# Patient Record
Sex: FEMALE | Race: WHITE | NOT HISPANIC OR LATINO | Employment: FULL TIME | URBAN - METROPOLITAN AREA
[De-identification: names, ages, dates, MRNs, and addresses within clinical notes are randomized per-mention and may not be internally consistent; named-entity substitution may affect disease eponyms.]

---

## 2018-06-19 ENCOUNTER — OFFICE VISIT (OUTPATIENT)
Dept: FAMILY MEDICINE | Facility: CLINIC | Age: 43
End: 2018-06-19
Payer: COMMERCIAL

## 2018-06-19 VITALS
HEIGHT: 67 IN | TEMPERATURE: 99.6 F | BODY MASS INDEX: 35.28 KG/M2 | OXYGEN SATURATION: 96 % | DIASTOLIC BLOOD PRESSURE: 66 MMHG | SYSTOLIC BLOOD PRESSURE: 112 MMHG | WEIGHT: 224.8 LBS | HEART RATE: 98 BPM | RESPIRATION RATE: 18 BRPM

## 2018-06-19 DIAGNOSIS — L27.0 ALLERGIC DRUG RASH: Primary | ICD-10-CM

## 2018-06-19 PROCEDURE — 99213 OFFICE O/P EST LOW 20 MIN: CPT | Performed by: NURSE PRACTITIONER

## 2018-06-19 RX ORDER — DESOGESTREL AND ETHINYL ESTRADIOL 21-5 (28)
KIT ORAL
COMMUNITY
Start: 2011-08-17 | End: 2023-11-27

## 2018-06-19 RX ORDER — FAMOTIDINE 20 MG/1
20 TABLET, FILM COATED ORAL
COMMUNITY
Start: 2017-05-02 | End: 2018-11-29 | Stop reason: ENTERED-IN-ERROR

## 2018-06-19 RX ORDER — DEXAMETHASONE SODIUM PHOSPHATE 4 MG/ML
8 INJECTION, SOLUTION INTRA-ARTICULAR; INTRALESIONAL; INTRAMUSCULAR; INTRAVENOUS; SOFT TISSUE ONCE
Status: COMPLETED | OUTPATIENT
Start: 2018-06-19 | End: 2018-06-19

## 2018-06-19 RX ORDER — CITALOPRAM 40 MG/1
40 TABLET, FILM COATED ORAL
COMMUNITY
Start: 2017-05-17 | End: 2018-06-20 | Stop reason: SDUPTHER

## 2018-06-19 RX ADMIN — DEXAMETHASONE SODIUM PHOSPHATE 8 MG: 4 INJECTION, SOLUTION INTRA-ARTICULAR; INTRALESIONAL; INTRAMUSCULAR; INTRAVENOUS; SOFT TISSUE at 11:02

## 2018-06-19 ASSESSMENT — ENCOUNTER SYMPTOMS
COUGH: 0
NAUSEA: 1
WHEEZING: 0
FATIGUE: 1
FEVER: 0
HEADACHES: 1
CONSTIPATION: 0
SHORTNESS OF BREATH: 0
ABDOMINAL PAIN: 0
CHILLS: 1
DIARRHEA: 0

## 2018-06-19 NOTE — LETTER
June 19, 2018     Patient: Barbra Norwood   YOB: 1975   Date of Visit: 6/19/2018       To Whom it May Concern:    Barbra Norwood was seen in my clinic on 6/19/2018 at 10:40 am. Please excuse Barbra for her absence from work on this day to make the appointment.    If you have any questions or concerns, please don't hesitate to call.         Sincerely,         ANGELITA Valdez        CC: No Recipients

## 2018-06-19 NOTE — PATIENT INSTRUCTIONS
Pt presents with diffuse drug induced rash. Stop Bactrim. Site of tattoo is not hot or erythematous at this time .      Decadron 8mg IM given x1. Pt was hypotensive on initial eval, improved with rest and laying pt supine with knees elevated. Pt called for a ride home from a friend. You can also take OTC Benedryl 25mg every 6-8 hours if needed.     Maintain hydration    If symptoms return, if you become short of breath, or dizzy, call 911 and go to the closest emergency department right away for evaluation. Pt picked up by friend Sonia

## 2018-06-19 NOTE — PROGRESS NOTES
[facility]  [address] [place] [address]  [phone]     Reason for visit:   Chief Complaint   Patient presents with   • Rash     SX for about 1 day. Pt states that locations are on thighs, stomach, arms and neck. Pt had started SMZ/TMP DS 800mg /160      HPI   Barbra Norwood is a 43 y.o. female who presents with drug induced allergy      Past Medical History:   Diagnosis Date   • Arthritis    • Depression      Past Surgical History:   Procedure Laterality Date   •  SECTION      2x   • TONSILLECTOMY       Social History     Social History Narrative   • No narrative on file     Family History   Problem Relation Age of Onset   • Breast cancer Mother    • Arthritis Mother    • Arthritis Maternal Grandmother    • Arthritis Maternal Grandfather    • Breast cancer Mother's Sister    • Autism Child      Penicillins  Current Outpatient Prescriptions   Medication Sig Dispense Refill   • citalopram (CELEXA) 40 mg tablet 40 mg.     • desog-e.estradiol/e.estradiol (MIRCETTE, 28,) 0.15-0.02 mgx21 /0.01 mg x 5 per tablet take 1 tablet by oral route  every day     • famotidine (PEPCID) 20 mg tablet 20 mg.     • omega 3-dha-epa-fish oil (FISH OIL) capsule 2 grams daily       No current facility-administered medications for this visit.        Review of Systems   Constitutional: Positive for chills and fatigue. Negative for fever.   Respiratory: Negative for cough, shortness of breath and wheezing.    Cardiovascular: Negative for chest pain.   Gastrointestinal: Positive for nausea. Negative for abdominal pain, constipation and diarrhea.   Skin:        Infected tattoo site L ankle, diffuse rash started today after a week of Bactrim   Neurological: Positive for headaches.       Objective     Vitals:    18 1050   BP: 112/66   Pulse: 98   Resp:    Temp:    SpO2: 96%       Physical Exam   Constitutional: She appears well-developed and well-nourished.   HENT:   Head: Normocephalic and  atraumatic.   Cardiovascular: Regular rhythm and normal heart sounds.  Tachycardia present.    Pulmonary/Chest: Effort normal and breath sounds normal.   Skin: Rash noted. There is pallor.   Pt is pale with diffuse rash on neck , arms, and abdomen. Pt states she began to feel nauseous and weak 2d ago, worsening since. Pt states she went to bed last night after work and did not get up again until this morning. Pt states diffuse rash started this morning.        Recent labs before today:     Lab Results   Component Value Date    WBC 6.8 05/10/2017    HGB 13.7 05/10/2017    HCT 41.4 05/10/2017     05/10/2017    CHOL 208 (H) 09/29/2016    TRIG 183 (H) 09/29/2016    HDL 60 09/29/2016    ALT 11 09/29/2016    AST 13 09/29/2016     09/29/2016    K 4.8 09/29/2016     09/29/2016    CREATININE 0.80 09/29/2016    BUN 14 09/29/2016    CO2 23 09/29/2016    TSH 2.81 05/10/2017    HGBA1C 5.6 09/29/2016        Procedures   Assessment   [unfilled]  Problem List Items Addressed This Visit     None      Visit Diagnoses     Allergic drug rash    -  Primary    Relevant Medications    dexamethasone (DECADRON) injection 8 mg (Completed)              ANGELITA Valdez  6/19/2018

## 2018-06-20 ENCOUNTER — TELEPHONE (OUTPATIENT)
Dept: FAMILY MEDICINE | Facility: CLINIC | Age: 43
End: 2018-06-20

## 2018-06-20 RX ORDER — PREDNISONE 10 MG/1
TABLET ORAL
Qty: 12 TABLET | Refills: 0 | Status: SHIPPED | OUTPATIENT
Start: 2018-06-20 | End: 2018-06-21

## 2018-06-20 NOTE — TELEPHONE ENCOUNTER
Started benadryl and had decadron injection yesterday. Now her face and ears are red and hot.  Should she start prednisone?  Allergic to PCN and sulfa. Uses [facility].  Did leave you a VM this morning.

## 2018-06-20 NOTE — TELEPHONE ENCOUNTER
LM for pt that if she is having any signs of an allergic reaction to go to an ER immediately  In interim will discuss with Kavon, unsure if decadron can caused that flush feeling pt noted

## 2018-06-20 NOTE — TELEPHONE ENCOUNTER
Half life of Bactrim is approximately 11 hours, it will take up to 6 half-life intervals for the drug to be out of the body as it had been taken consistently prior to the reaction for several days. 6 half-life intervals is 2.5 days from the last dose. Residual symptoms are likely from the Bactrim still in her system.    I am OK with prednisone taper - sent to [facility] on [address] in [place].

## 2018-06-21 ENCOUNTER — OFFICE VISIT (OUTPATIENT)
Dept: FAMILY MEDICINE | Facility: CLINIC | Age: 43
End: 2018-06-21
Payer: COMMERCIAL

## 2018-06-21 VITALS
HEIGHT: 67 IN | WEIGHT: 222 LBS | SYSTOLIC BLOOD PRESSURE: 112 MMHG | TEMPERATURE: 98 F | HEART RATE: 76 BPM | BODY MASS INDEX: 34.84 KG/M2 | DIASTOLIC BLOOD PRESSURE: 78 MMHG | OXYGEN SATURATION: 99 %

## 2018-06-21 DIAGNOSIS — Z88.9 DRUG ALLERGY: Primary | ICD-10-CM

## 2018-06-21 PROCEDURE — 99213 OFFICE O/P EST LOW 20 MIN: CPT | Performed by: FAMILY MEDICINE

## 2018-06-21 RX ORDER — PREDNISONE 20 MG/1
TABLET ORAL
Qty: 7 TABLET | Refills: 0 | Status: SHIPPED | OUTPATIENT
Start: 2018-06-21 | End: 2018-11-29 | Stop reason: ENTERED-IN-ERROR

## 2018-06-21 ASSESSMENT — ENCOUNTER SYMPTOMS
SHORTNESS OF BREATH: 0
ABDOMINAL PAIN: 0
HEADACHES: 0
LIGHT-HEADEDNESS: 0
COUGH: 0
FEVER: 0
ABDOMINAL DISTENTION: 0
DIFFICULTY URINATING: 0
BLOOD IN STOOL: 0
UNEXPECTED WEIGHT CHANGE: 0
WEAKNESS: 0
FATIGUE: 0
VOMITING: 0
TROUBLE SWALLOWING: 0
ADENOPATHY: 0
PALPITATIONS: 0
CONSTIPATION: 0
WHEEZING: 0
NAUSEA: 0
DIZZINESS: 0
DIARRHEA: 0

## 2018-06-21 NOTE — PATIENT INSTRUCTIONS
Patient Education     Hives  Hives (urticaria) are itchy, red, swollen areas on your skin. Hives can appear on any part of your body and can vary in size. They can be as small as the tip of a pen or much larger. Hives often fade within 24 hours (acute hives). In other cases, new hives appear after old ones fade. This cycle can continue for several days or weeks (chronic hives).  Hives result from your body's reaction to an irritant or to something that you are allergic to (trigger). When you are exposed to a trigger, your body releases a chemical (histamine) that causes redness, itching, and swelling. You can get hives immediately after being exposed to a trigger or hours later.  Hives do not spread from person to person (are not contagious). Your hives may get worse with scratching, exercise, and emotional stress.  What are the causes?  Causes of this condition include:  · Allergies to certain foods or ingredients.  · Insect bites or stings.  · Exposure to pollen or pet dander.  · Contact with latex or chemicals.  · Spending time in sunlight, heat, or cold (exposure).  · Exercise.  · Stress.  You can also get hives from some medical conditions and treatments. These include:  · Viruses, including the common cold.  · Bacterial infections, such as urinary tract infections and strep throat.  · Disorders such as vasculitis, lupus, or thyroid disease.  · Certain medications.  · Allergy shots.  · Blood transfusions.  Sometimes, the cause of hives is not known (idiopathic hives).  What increases the risk?  This condition is more likely to develop in:  · Women.  · People who have food allergies, especially to citrus fruits, milk, eggs, peanuts, tree nuts, or shellfish.  · People who are allergic to:  ¨ Medicines.  ¨ Latex.  ¨ Insects.  ¨ Animals.  ¨ Pollen.  · People who have certain medical conditions, including lupus or thyroid disease.  What are the signs or symptoms?  The main symptom of this condition is raised,  itchy red or white bumps or patches on your skin. These areas may:  · Become large and swollen (welts).  · Change in shape and location, quickly and repeatedly.  · Be separate hives or connect over a large area of skin.  · Sting or become painful.  · Turn white when pressed in the center (bailee).  In severe cases, your hands, feet, and face may also become swollen. This may occur if hives develop deeper in your skin.  How is this diagnosed?  This condition is diagnosed based on your symptoms, medical history, and physical exam. Your skin, urine, or blood may be tested to find out what is causing your hives and to rule out other health issues. Your health care provider may also remove a small sample of skin from the affected area and examine it under a microscope (biopsy).  How is this treated?  Treatment depends on the severity of your condition. Your health care provider may recommend using cool, wet cloths (cool compresses) or taking cool showers to relieve itching. Hives are sometimes treated with medicines, including:  · Antihistamines.  · Corticosteroids.  · Antibiotics.  · An injectable medicine (omalizumab). Your health care provider may prescribe this if you have chronic idiopathic hives and you continue to have symptoms even after treatment with antihistamines.  Severe cases may require an emergency injection of adrenaline (epinephrine) to prevent a life-threatening allergic reaction (anaphylaxis).  Follow these instructions at home:  Medicines  · Take or apply over-the-counter and prescription medicines only as told by your health care provider.  · If you were prescribed an antibiotic medicine, use it as told by your health care provider. Do not stop taking the antibiotic even if you start to feel better.  Skin Care  · Apply cool compresses to the affected areas.  · Do not scratch or rub your skin.  General instructions  · Do not take hot showers or baths. This can make itching worse.  · Do not wear  tight-fitting clothing.  · Use sunscreen and wear protective clothing when you are outside.  · Avoid any substances that cause your hives. Keep a journal to help you track what causes your hives. Write down:  ¨ What medicines you take.  ¨ What you eat and drink.  ¨ What products you use on your skin.  · Keep all follow-up visits as told by your health care provider. This is important.  Contact a health care provider if:  · Your symptoms are not controlled with medicine.  · Your joints are painful or swollen.  Get help right away if:  · You have a fever.  · You have pain in your abdomen.  · Your tongue or lips are swollen.  · Your eyelids are swollen.  · Your chest or throat feels tight.  · You have trouble breathing or swallowing.  These symptoms may represent a serious problem that is an emergency. Do not wait to see if the symptoms will go away. Get medical help right away. Call your local emergency services (911 in the [place] Do not drive yourself to the hospital.  This information is not intended to replace advice given to you by your health care provider. Make sure you discuss any questions you have with your health care provider.  Document Released: 12/18/2006 Document Revised: 05/17/2017 Document Reviewed: 10/05/2016  Elsevier Interactive Patient Education © 2017 [employer]

## 2018-06-21 NOTE — PROGRESS NOTES
Daily Progress Note       Patient ID: Barbra Norwood is a 43 y.o. female.    HPI    43 year old female presents for follow up visit.     Seen on 6/19/2018 by Robert Carranza. Presented with symptoms of rash on thighs, stomach, arms and neck. Had been started on Bactrim DS due to infected tattoo site on left ankle. After week of Bactrim the rash began. In the office decadron 8 mg IM was administered. Reports that day it felt like the rash was improving - her body which had felt like it was burning symptoms resolved. Yesterday the burning symptoms started again and the rash has now also spread to face and lower extremities. Has been taking benadryl 50 mg q4 hours but her last dose today was around 3 am.      The following have been reviewed and updated as appropriate in this visit:  Allergies  Meds  Problems       Review of Systems   Constitutional: Negative for fatigue, fever and unexpected weight change.   HENT: Negative for trouble swallowing.    Eyes: Negative for visual disturbance.   Respiratory: Negative for cough, shortness of breath and wheezing.    Cardiovascular: Negative for chest pain, palpitations and leg swelling.   Gastrointestinal: Negative for abdominal distention, abdominal pain, blood in stool, constipation, diarrhea, nausea and vomiting.   Endocrine: Negative for cold intolerance and heat intolerance.   Genitourinary: Negative for difficulty urinating.   Skin: Positive for rash.   Neurological: Negative for dizziness, syncope, weakness, light-headedness and headaches.   Hematological: Negative for adenopathy.             Current Outpatient Prescriptions   Medication Sig Dispense Refill   • citalopram (CELEXA) 40 mg tablet TAKE 1 TABLET BY MOUTH EVERY DAY 90 tablet 0   • desog-e.estradiol/e.estradiol (MIRCETTE, 28,) 0.15-0.02 mgx21 /0.01 mg x 5 per tablet take 1 tablet by oral route  every day     • omega 3-dha-epa-fish oil (FISH OIL) capsule 2 grams daily     • famotidine (PEPCID) 20 mg  "tablet 20 mg.     • predniSONE (DELTASONE) 20 mg tablet Days 1-2: 2 tablets once daily; Days 3-5: 1 tablet once daily 7 tablet 0     No current facility-administered medications for this visit.      Past Medical History:   Diagnosis Date   • Arthritis    • Depression      Family History   Problem Relation Age of Onset   • Breast cancer Mother    • Arthritis Mother    • Arthritis Maternal Grandmother    • Arthritis Maternal Grandfather    • Breast cancer Mother's Sister    • Autism Child      Past Surgical History:   Procedure Laterality Date   •  SECTION      2x   • TONSILLECTOMY       Social History     Social History   • Marital status:      Spouse name: N/A   • Number of children: N/A   • Years of education: N/A     Occupational History   • Not on file.     Social History Main Topics   • Smoking status: Never Smoker   • Smokeless tobacco: Never Used   • Alcohol use Yes      Comment: rarely   • Drug use: No   • Sexual activity: Not on file     Other Topics Concern   • Not on file     Social History Narrative   • No narrative on file     Allergies   Allergen Reactions   • Bactrim [Sulfamethoxazole-Trimethoprim] Hives   • Penicillins Angioedema       Objective   No new labs.    Vitals:    18 0958   BP: 112/78   BP Location: Left upper arm   Patient Position: Sitting   Pulse: 76   Temp: 36.7 °C (98 °F)   TempSrc: Oral   SpO2: 99%   Weight: 101 kg (222 lb)   Height: 1.689 m (5' 6.5\")         Physical Exam   Constitutional: She is oriented to person, place, and time. She appears well-developed and well-nourished.   HENT:   Head: Normocephalic and atraumatic.   Mouth/Throat: Oropharynx is clear and moist.   Eyes: Conjunctivae and EOM are normal. Pupils are equal, round, and reactive to light.   Neck: Normal range of motion. Neck supple.   Cardiovascular: Normal rate, regular rhythm and normal heart sounds.    Pulmonary/Chest: Effort normal and breath sounds normal. No respiratory distress. "   Abdominal: Soft. Bowel sounds are normal. She exhibits no distension. There is no tenderness.   Musculoskeletal: Normal range of motion. She exhibits no edema.   Neurological: She is alert and oriented to person, place, and time.   Skin: Skin is warm and dry.   Diffuse rash on face, trunk, upper and lower extremities   Nursing note and vitals reviewed.      Assessment/Plan   Problem List Items Addressed This Visit     None      Visit Diagnoses     Drug allergy    -  Primary    Relevant Medications    predniSONE (DELTASONE) 20 mg tablet      Counseled patient to start 2nd generation antihistamine at this time (she states she has Xyzal at home so will start that) and continue benadryl as tolerated. Recommended use of pepcid of the antihistamine properties. Will start patient on prednisone at this time. Advised patient if symptoms worsen including shortness of breath or feeling if throat is closing - to go immediately to the ER for further evaluation. Counseled patient about signs and symptoms of SJS - to go immediately to the ER if symptoms occur.       Keith Bazan MD  6/21/2018

## 2018-09-24 RX ORDER — CITALOPRAM 40 MG/1
TABLET, FILM COATED ORAL
Qty: 90 TABLET | Refills: 0 | Status: SHIPPED | OUTPATIENT
Start: 2018-09-24 | End: 2018-12-19 | Stop reason: SDUPTHER

## 2018-11-29 ENCOUNTER — OFFICE VISIT (OUTPATIENT)
Dept: FAMILY MEDICINE | Facility: CLINIC | Age: 43
End: 2018-11-29
Payer: COMMERCIAL

## 2018-11-29 ENCOUNTER — HOSPITAL ENCOUNTER (OUTPATIENT)
Dept: RADIOLOGY | Age: 43
Discharge: HOME | End: 2018-11-29
Attending: FAMILY MEDICINE
Payer: COMMERCIAL

## 2018-11-29 VITALS
SYSTOLIC BLOOD PRESSURE: 118 MMHG | RESPIRATION RATE: 16 BRPM | TEMPERATURE: 98.3 F | BODY MASS INDEX: 36.34 KG/M2 | HEART RATE: 65 BPM | WEIGHT: 228.6 LBS | DIASTOLIC BLOOD PRESSURE: 62 MMHG | OXYGEN SATURATION: 98 %

## 2018-11-29 DIAGNOSIS — R22.32 MASS OF LEFT WRIST: ICD-10-CM

## 2018-11-29 DIAGNOSIS — R22.32 MASS OF LEFT WRIST: Primary | ICD-10-CM

## 2018-11-29 PROCEDURE — 99213 OFFICE O/P EST LOW 20 MIN: CPT | Performed by: FAMILY MEDICINE

## 2018-11-29 PROCEDURE — 73110 X-RAY EXAM OF WRIST: CPT | Mod: LT

## 2018-11-29 ASSESSMENT — ENCOUNTER SYMPTOMS
SINUS PAIN: 0
FEVER: 0
ARTHRALGIAS: 0
FREQUENCY: 0
MYALGIAS: 0
EYE PAIN: 0
NERVOUS/ANXIOUS: 0
FATIGUE: 0
PALPITATIONS: 0
HEMATURIA: 0
COUGH: 0
CHILLS: 0
DIARRHEA: 0
CONSTIPATION: 0
DIZZINESS: 0
RHINORRHEA: 0
WHEEZING: 0
BRUISES/BLEEDS EASILY: 0
SORE THROAT: 0
HEADACHES: 0
SHORTNESS OF BREATH: 0
ABDOMINAL PAIN: 0
SINUS PRESSURE: 0
WEAKNESS: 0
CHEST TIGHTNESS: 0

## 2018-11-29 NOTE — PROGRESS NOTES
Subjective      Patient ID: Barbra Norwood is a 43 y.o. female.    She is here for L wrist mass for few months. It is not painful but getting bigger and more annoying. She would like to get it addressed.      Wrist Pain    Pertinent negatives include no fever.       The following have been reviewed and updated as appropriate in this visit:  Allergies  Meds  Problems       Review of Systems   Constitutional: Negative for chills, fatigue and fever.   HENT: Negative for ear pain, postnasal drip, rhinorrhea, sinus pain, sinus pressure and sore throat.    Eyes: Negative for pain and visual disturbance.   Respiratory: Negative for cough, chest tightness, shortness of breath and wheezing.    Cardiovascular: Negative for chest pain and palpitations.   Gastrointestinal: Negative for abdominal pain, constipation and diarrhea.   Genitourinary: Negative for decreased urine volume, frequency and hematuria.   Musculoskeletal: Negative for arthralgias and myalgias.   Skin: Negative for rash.   Neurological: Negative for dizziness, weakness and headaches.   Hematological: Does not bruise/bleed easily.   Psychiatric/Behavioral: Negative for behavioral problems. The patient is not nervous/anxious.        Current Outpatient Prescriptions   Medication Sig Dispense Refill   • citalopram (CELEXA) 40 mg tablet TAKE 1 TABLET BY MOUTH EVERY DAY 90 tablet 0   • desog-e.estradiol/e.estradiol (MIRCETTE, 28,) 0.15-0.02 mgx21 /0.01 mg x 5 per tablet take 1 tablet by oral route  every day       No current facility-administered medications for this visit.      Past Medical History:   Diagnosis Date   • Arthritis    • Depression      Family History   Problem Relation Age of Onset   • Breast cancer Mother    • Arthritis Mother    • Arthritis Maternal Grandmother    • Arthritis Maternal Grandfather    • Breast cancer Mother's Sister    • Autism Child      Past Surgical History:   Procedure Laterality Date   •  SECTION      2x   •  TONSILLECTOMY       Social History     Social History   • Marital status:      Spouse name: N/A   • Number of children: N/A   • Years of education: N/A     Occupational History   • Not on file.     Social History Main Topics   • Smoking status: Never Smoker   • Smokeless tobacco: Never Used   • Alcohol use Yes      Comment: rarely   • Drug use: No   • Sexual activity: Not on file     Other Topics Concern   • Not on file     Social History Narrative   • No narrative on file     Allergies   Allergen Reactions   • Bactrim [Sulfamethoxazole-Trimethoprim] Hives   • Penicillins Angioedema       Objective   /62 (BP Location: Right upper arm, Patient Position: Sitting)   Pulse 65   Temp 36.8 °C (98.3 °F) (Oral)   Resp 16   Wt 104 kg (228 lb 9.6 oz)   SpO2 98%   BMI 36.34 kg/m²   Physical Exam   Constitutional: She is oriented to person, place, and time. She appears well-developed and well-nourished.   HENT:   Head: Normocephalic and atraumatic.   Right Ear: External ear normal.   Left Ear: External ear normal.   Eyes: Conjunctivae are normal. Pupils are equal, round, and reactive to light.   Neck: Normal range of motion. Neck supple. No thyromegaly present.   Cardiovascular: Normal rate, regular rhythm and normal heart sounds.    No murmur heard.  Pulmonary/Chest: Effort normal and breath sounds normal. She has no wheezes.   Abdominal: Soft. Bowel sounds are normal. There is no tenderness.   Musculoskeletal: Normal range of motion. She exhibits no tenderness.   -L wrist- lateral aspect with small hard nontender raised mass ~0.5cm in size, normal pulse, full ROM   Lymphadenopathy:     She has no cervical adenopathy.   Neurological: She is alert and oriented to person, place, and time. No cranial nerve deficit or sensory deficit.   Skin: Skin is warm and dry. No rash noted.   Psychiatric: She has a normal mood and affect.   Nursing note and vitals reviewed.      Assessment/Plan   Problem List Items  Addressed This Visit     None      Visit Diagnoses     Mass of left wrist    -  Primary    Relevant Orders    X-RAY WRIST LEFT 3+ VIEWS        We will have xray done and have her see hand specialist given it is getting bigger and more bothersome and keep me updated.   Mignon Encarnacion, DO  11/29/2018

## 2018-12-03 ENCOUNTER — TELEPHONE (OUTPATIENT)
Dept: FAMILY MEDICINE | Facility: CLINIC | Age: 43
End: 2018-12-03

## 2018-12-04 NOTE — TELEPHONE ENCOUNTER
LMFP xray of wrist is ok except there is some osteoarthritis but they cannot see the mass- so make sure she brings disc to hand specialist and keep me updated he may want her to do an MRI          ----- Message from Mignon Encarnacion,  sent at 11/29/2018  7:18 PM EST -----  Please let her know xray of wrist is ok except there is some osteoarthritis but they cannot see the mass- so make sure she brings disc to hand specialist and keep me updated he may want her to do an MRI

## 2018-12-26 ENCOUNTER — TELEPHONE (OUTPATIENT)
Dept: FAMILY MEDICINE | Facility: CLINIC | Age: 43
End: 2018-12-26

## 2018-12-27 NOTE — TELEPHONE ENCOUNTER
Please let her know got her recent fasting labs and look good overall and to continue with well balanced meals and exercise 4x wk fro 30min and recheck 1yr

## 2019-02-19 ENCOUNTER — OFFICE VISIT (OUTPATIENT)
Dept: FAMILY MEDICINE | Facility: CLINIC | Age: 44
End: 2019-02-19
Payer: COMMERCIAL

## 2019-02-19 VITALS
TEMPERATURE: 97.8 F | BODY MASS INDEX: 36.22 KG/M2 | OXYGEN SATURATION: 98 % | HEART RATE: 76 BPM | SYSTOLIC BLOOD PRESSURE: 120 MMHG | RESPIRATION RATE: 16 BRPM | WEIGHT: 227.8 LBS | DIASTOLIC BLOOD PRESSURE: 68 MMHG

## 2019-02-19 DIAGNOSIS — F32.5 MAJOR DEPRESSIVE DISORDER IN FULL REMISSION, UNSPECIFIED WHETHER RECURRENT (CMS/HCC): Primary | ICD-10-CM

## 2019-02-19 PROCEDURE — 99213 OFFICE O/P EST LOW 20 MIN: CPT | Performed by: FAMILY MEDICINE

## 2019-02-19 RX ORDER — CITALOPRAM 40 MG/1
40 TABLET, FILM COATED ORAL
Qty: 90 TABLET | Refills: 2 | Status: SHIPPED | OUTPATIENT
Start: 2019-02-19 | End: 2019-12-08 | Stop reason: SDUPTHER

## 2019-02-19 ASSESSMENT — ENCOUNTER SYMPTOMS
DIARRHEA: 0
HEMATURIA: 0
FREQUENCY: 0
DYSURIA: 0
LIGHT-HEADEDNESS: 0
SINUS PRESSURE: 0
DIZZINESS: 0
CONSTIPATION: 0
BACK PAIN: 0
SINUS PAIN: 0
CHILLS: 0
SLEEP DISTURBANCE: 0
WHEEZING: 0
SHORTNESS OF BREATH: 0
NERVOUS/ANXIOUS: 0
COUGH: 0
FATIGUE: 0
ABDOMINAL PAIN: 0
NECK PAIN: 0
PALPITATIONS: 0
HEADACHES: 0
FEVER: 0
SORE THROAT: 0
RHINORRHEA: 0

## 2019-02-19 NOTE — PROGRESS NOTES
Subjective      Patient ID: Barbra Norwood is a 44 y.o. female.    She is here for med check. She has been feeling well overall. She is walking 2-3x wk for 30min. UTD with fasting labs. UTD with eye and dental exam. Mood has been good overall. She is seeing ob/gyn  for annual gyn and mammo.         The following have been reviewed and updated as appropriate in this visit:  Allergies  Meds  Problems       Review of Systems   Constitutional: Negative for chills, fatigue and fever.   HENT: Negative for ear pain, postnasal drip, rhinorrhea, sinus pain, sinus pressure and sore throat.    Respiratory: Negative for cough, shortness of breath and wheezing.    Cardiovascular: Negative for chest pain and palpitations.   Gastrointestinal: Negative for abdominal pain, constipation and diarrhea.   Genitourinary: Negative for dysuria, frequency, hematuria and urgency.   Musculoskeletal: Negative for back pain and neck pain.   Neurological: Negative for dizziness, light-headedness and headaches.   Psychiatric/Behavioral: Negative for sleep disturbance and suicidal ideas. The patient is not nervous/anxious.        Current Outpatient Prescriptions   Medication Sig Dispense Refill   • citalopram (celeXA) 40 mg tablet Take 1 tablet (40 mg total) by mouth once daily. 90 tablet 2   • desog-e.estradiol/e.estradiol (MIRCETTE, 28,) 0.15-0.02 mgx21 /0.01 mg x 5 per tablet take 1 tablet by oral route  every day       No current facility-administered medications for this visit.      Past Medical History:   Diagnosis Date   • Arthritis    • Depression      Family History   Problem Relation Age of Onset   • Breast cancer Mother    • Arthritis Mother    • Arthritis Maternal Grandmother    • Arthritis Maternal Grandfather    • Breast cancer Mother's Sister    • Autism Child      Past Surgical History:   Procedure Laterality Date   •  SECTION      2x   • CYST REMOVAL  2019   • TONSILLECTOMY       Social History     Social  History   • Marital status:      Spouse name: N/A   • Number of children: N/A   • Years of education: N/A     Occupational History   • Not on file.     Social History Main Topics   • Smoking status: Never Smoker   • Smokeless tobacco: Never Used   • Alcohol use Yes      Comment: rarely   • Drug use: No   • Sexual activity: Not on file     Other Topics Concern   • Not on file     Social History Narrative   • No narrative on file     Allergies   Allergen Reactions   • Bactrim [Sulfamethoxazole-Trimethoprim] Hives   • Penicillins Angioedema       Objective   /68 (BP Location: Right upper arm, Patient Position: Sitting)   Pulse 76   Temp 36.6 °C (97.8 °F) (Oral)   Resp 16   Wt 103 kg (227 lb 12.8 oz)   SpO2 98%   BMI 36.22 kg/m²   Physical Exam   Constitutional: She is oriented to person, place, and time. She appears well-developed and well-nourished.   HENT:   Head: Normocephalic and atraumatic.   Right Ear: External ear normal.   Left Ear: External ear normal.   Eyes: Conjunctivae are normal. Pupils are equal, round, and reactive to light.   Neck: Normal range of motion. Neck supple. No thyromegaly present.   Cardiovascular: Normal rate, regular rhythm and normal heart sounds.    No murmur heard.  Pulmonary/Chest: Effort normal and breath sounds normal. She has no wheezes.   Abdominal: Soft. Bowel sounds are normal. There is no tenderness.   Musculoskeletal: Normal range of motion. She exhibits no tenderness.   Lymphadenopathy:     She has no cervical adenopathy.   Neurological: She is alert and oriented to person, place, and time. No cranial nerve deficit.   Skin: Skin is warm and dry. No rash noted.   Psychiatric: She has a normal mood and affect.   Nursing note and vitals reviewed.      Assessment/Plan   Problem List Items Addressed This Visit        Other    Depression - Primary    Relevant Medications    citalopram (celeXA) 40 mg tablet        She is doing well overall. Tolerating medication  well. Will refill celexa and recheck in 1yr.   Mignon Encarnacion, DO  2/19/2019

## 2019-07-03 ENCOUNTER — OFFICE VISIT (OUTPATIENT)
Dept: FAMILY MEDICINE | Facility: CLINIC | Age: 44
End: 2019-07-03
Payer: COMMERCIAL

## 2019-07-03 ENCOUNTER — NURSE TRIAGE (OUTPATIENT)
Dept: FAMILY MEDICINE | Facility: CLINIC | Age: 44
End: 2019-07-03

## 2019-07-03 VITALS
BODY MASS INDEX: 32.85 KG/M2 | TEMPERATURE: 98.3 F | HEART RATE: 88 BPM | SYSTOLIC BLOOD PRESSURE: 120 MMHG | WEIGHT: 209.3 LBS | RESPIRATION RATE: 16 BRPM | HEIGHT: 67 IN | OXYGEN SATURATION: 97 % | DIASTOLIC BLOOD PRESSURE: 70 MMHG

## 2019-07-03 DIAGNOSIS — R94.31 ABNORMAL EKG: ICD-10-CM

## 2019-07-03 DIAGNOSIS — E78.00 PURE HYPERCHOLESTEROLEMIA: ICD-10-CM

## 2019-07-03 DIAGNOSIS — R00.2 PALPITATIONS: Primary | ICD-10-CM

## 2019-07-03 PROCEDURE — 99214 OFFICE O/P EST MOD 30 MIN: CPT | Performed by: FAMILY MEDICINE

## 2019-07-03 PROCEDURE — 93000 ELECTROCARDIOGRAM COMPLETE: CPT | Performed by: FAMILY MEDICINE

## 2019-07-03 ASSESSMENT — ENCOUNTER SYMPTOMS
FEVER: 0
ARTHRALGIAS: 0
HEMATURIA: 0
MYALGIAS: 0
WEAKNESS: 0
DIAPHORESIS: 1
DIARRHEA: 0
CHILLS: 0
NERVOUS/ANXIOUS: 1
PALPITATIONS: 1
SINUS PAIN: 0
SLEEP DISTURBANCE: 0
COUGH: 0
FREQUENCY: 0
HEADACHES: 1
CONSTIPATION: 0
SINUS PRESSURE: 0
RHINORRHEA: 0
SHORTNESS OF BREATH: 1
BRUISES/BLEEDS EASILY: 0
WHEEZING: 0
DIZZINESS: 0
SORE THROAT: 0
CHEST TIGHTNESS: 0
ABDOMINAL PAIN: 0
FATIGUE: 1
EYE PAIN: 0

## 2019-07-03 NOTE — TELEPHONE ENCOUNTER
"Spoke to patient who reports that she woke up at 6am this morning and was getting ready for work. Around 7am she felt like her heart was racing and she started sweating. She laid down for 15min and sweating has improved but reports that her HR still feels elevated. She attempted to measure her HR but reports that she is not skilled enough to measure it accurately but that it does feel fast to her. She denies SOB or MOORE. She does report that her heart feels \"tired\" from the elevated HR but denies chest pain or tightness. She was dizzy upon standing this morning but reports that this has resolved. She denies N/V. She has eaten breakfast this morning but did not have her coffee, only water. She is not on any medications besides Celexa. She denies any cardiac hx.    Patient advised to book appt this morning for eval + EKG. Patient advised that if s/sx worsen or SOB, CP, N/V develops that she needs to be seen in ER right away.       Reason for Disposition  • Visible sweat on face or sweat dripping down face     Was sweating a lot this morning, but reports that this has slowed way down  • Dizziness, lightheadedness, or weakness     Reports that when she stands up she is dizzy    Additional Information  • Negative: Chest pain     Reports that her chest has no pain, no tightness but reports that it \"feels tired\" from her heart overbeating    Protocols used: HEART RATE AND HEARTBEAT QUESTIONS-ADULT-[place]      "

## 2019-07-03 NOTE — PROGRESS NOTES
Subjective      Patient ID: Barbra Norwood is a 44 y.o. female.    She is here for palpitations. She was doing well until this am. She woke up this am. She took her shower and ate her breakfast- chocolate pancakes. She was going from bedroom to bathroom and felt MOORE, palpitations and sweating around forehead and back of neck and laid down. She then felt heart pounding and felt dizzy. No nausea or chest pain. Whole episode lasted 1hr. She is feeling better now but just tired with a headache. Only thing different started optifast since end of May. She is going to gym few times a wk with no issues. No fam hx of CAD. Nothing different night before.         The following have been reviewed and updated as appropriate in this visit:  Meds       Review of Systems   Constitutional: Positive for diaphoresis and fatigue. Negative for chills and fever.   HENT: Negative for ear pain, postnasal drip, rhinorrhea, sinus pain, sinus pressure and sore throat.    Eyes: Negative for pain and visual disturbance.   Respiratory: Positive for shortness of breath. Negative for cough, chest tightness and wheezing.    Cardiovascular: Positive for palpitations. Negative for chest pain.   Gastrointestinal: Negative for abdominal pain, constipation and diarrhea.   Genitourinary: Negative for decreased urine volume, frequency and hematuria.   Musculoskeletal: Negative for arthralgias and myalgias.   Skin: Negative for rash.   Neurological: Positive for headaches. Negative for dizziness and weakness.   Hematological: Does not bruise/bleed easily.   Psychiatric/Behavioral: Negative for behavioral problems, sleep disturbance and suicidal ideas. The patient is nervous/anxious.        Current Outpatient Prescriptions   Medication Sig Dispense Refill   • citalopram (celeXA) 40 mg tablet Take 1 tablet (40 mg total) by mouth once daily. 90 tablet 2   • desog-e.estradiol/e.estradiol (MIRCETTE, 28,) 0.15-0.02 mgx21 /0.01 mg x 5 per tablet take 1 tablet  "by oral route  every day       No current facility-administered medications for this visit.      Past Medical History:   Diagnosis Date   • Arthritis    • Depression      Family History   Problem Relation Age of Onset   • Breast cancer Mother    • Arthritis Mother    • Arthritis Maternal Grandmother    • Arthritis Maternal Grandfather    • Breast cancer Mother's Sister    • Autism Child      Past Surgical History:   Procedure Laterality Date   •  SECTION      2x   • CYST REMOVAL  2019   • TONSILLECTOMY       Social History     Social History   • Marital status:      Spouse name: N/A   • Number of children: N/A   • Years of education: N/A     Occupational History   • Not on file.     Social History Main Topics   • Smoking status: Never Smoker   • Smokeless tobacco: Never Used   • Alcohol use Yes      Comment: rarely   • Drug use: No   • Sexual activity: Not on file     Other Topics Concern   • Not on file     Social History Narrative   • No narrative on file     Allergies   Allergen Reactions   • Bactrim [Sulfamethoxazole-Trimethoprim] Hives   • Penicillins Angioedema       Objective   /70 (BP Location: Right upper arm, Patient Position: Sitting)   Pulse 88   Temp 36.8 °C (98.3 °F) (Oral)   Resp 16   Ht 1.702 m (5' 7\")   Wt 94.9 kg (209 lb 4.8 oz)   SpO2 97%   BMI 32.78 kg/m²   Physical Exam   Constitutional: She is oriented to person, place, and time. She appears well-developed and well-nourished.   HENT:   Head: Normocephalic and atraumatic.   Right Ear: External ear normal.   Left Ear: External ear normal.   Eyes: Pupils are equal, round, and reactive to light. Conjunctivae are normal.   Neck: Normal range of motion. Neck supple. No thyromegaly present.   Cardiovascular: Normal rate, regular rhythm and normal heart sounds.    No murmur heard.  Pulmonary/Chest: Effort normal and breath sounds normal. She has no wheezes.   Abdominal: Soft. Bowel sounds are normal. There is no " tenderness.   Musculoskeletal: Normal range of motion. She exhibits no tenderness.   Lymphadenopathy:     She has no cervical adenopathy.   Neurological: She is alert and oriented to person, place, and time. No cranial nerve deficit.   Skin: Skin is warm and dry. No rash noted.   Psychiatric: She has a normal mood and affect.   Nursing note and vitals reviewed.      Assessment/Plan   Problem List Items Addressed This Visit     None      Visit Diagnoses     Palpitations    -  Primary    Relevant Orders    ECG 12 LEAD OFFICE PERFORMED (Completed)    TSH 3rd Generation    CBC    Abnormal EKG        Relevant Orders    Echocardiogram stress test    Pure hypercholesterolemia        Relevant Orders    Comprehensive metabolic panel    Lipid panel    Hemoglobin A1c      She is feeling ok at this time. EKG done- showing left axis, no prior to compare to yet no acute changes seen. We will check updated labs and get stress echo done and back down on the exercise and diet for now until testing completed. She knows if symptoms happen again she will go to ER. Will call me with update in few days.   Mignon Encarnacion, DO  7/3/2019,

## 2019-07-06 LAB
ALBUMIN SERPL-MCNC: 4 G/DL (ref 3.6–5.1)
ALBUMIN/GLOB SERPL: 1.5 (CALC) (ref 1–2.5)
ALP SERPL-CCNC: 66 U/L (ref 33–115)
ALT SERPL-CCNC: 33 U/L (ref 6–29)
AST SERPL-CCNC: 23 U/L (ref 10–35)
BILIRUB SERPL-MCNC: 0.5 MG/DL (ref 0.2–1.2)
BUN SERPL-MCNC: 16 MG/DL (ref 7–25)
BUN/CREAT SERPL: ABNORMAL (CALC) (ref 6–22)
CALCIUM SERPL-MCNC: 9.6 MG/DL (ref 8.6–10.2)
CHLORIDE SERPL-SCNC: 104 MMOL/L (ref 98–110)
CHOLEST SERPL-MCNC: 240 MG/DL
CHOLEST/HDLC SERPL: 4.3 (CALC)
CO2 SERPL-SCNC: 27 MMOL/L (ref 20–32)
CREAT SERPL-MCNC: 0.83 MG/DL (ref 0.5–1.1)
ERYTHROCYTE [DISTWIDTH] IN BLOOD BY AUTOMATED COUNT: 12.5 % (ref 11–15)
GLOBULIN SER CALC-MCNC: 2.6 G/DL (CALC) (ref 1.9–3.7)
GLUCOSE SERPL-MCNC: 92 MG/DL (ref 65–99)
HBA1C MFR BLD: 5.4 % OF TOTAL HGB
HCT VFR BLD AUTO: 42.8 % (ref 35–45)
HDLC SERPL-MCNC: 56 MG/DL
HGB BLD-MCNC: 14.2 G/DL (ref 11.7–15.5)
LDLC SERPL CALC-MCNC: 148 MG/DL (CALC)
MCH RBC QN AUTO: 30.1 PG (ref 27–33)
MCHC RBC AUTO-ENTMCNC: 33.2 G/DL (ref 32–36)
MCV RBC AUTO: 90.7 FL (ref 80–100)
NONHDLC SERPL-MCNC: 184 MG/DL (CALC)
PLATELET # BLD AUTO: 200 THOUSAND/UL (ref 140–400)
PMV BLD REES-ECKER: 12.3 FL (ref 7.5–12.5)
POTASSIUM SERPL-SCNC: 4.2 MMOL/L (ref 3.5–5.3)
PROT SERPL-MCNC: 6.6 G/DL (ref 6.1–8.1)
QUEST EGFR NON-AFR. AMERICAN: 85 ML/MIN/1.73M2
RBC # BLD AUTO: 4.72 MILLION/UL (ref 3.8–5.1)
SODIUM SERPL-SCNC: 138 MMOL/L (ref 135–146)
TRIGL SERPL-MCNC: 214 MG/DL
TSH SERPL-ACNC: 3.68 MIU/L
WBC # BLD AUTO: 7.4 THOUSAND/UL (ref 3.8–10.8)

## 2019-07-08 ENCOUNTER — TELEPHONE (OUTPATIENT)
Dept: FAMILY MEDICINE | Facility: CLINIC | Age: 44
End: 2019-07-08

## 2019-07-08 NOTE — TELEPHONE ENCOUNTER
swp she is aware of her labs , she states she is feeling okay - labs showing sugar looks ok. One liver enzyme very slightly elevated at 33 goal <30 and her total cholesterol total cholesterol 240 goal <200, TGs 214 goal <150, HDL 56 goal >50 and  goal <120 so see how she is feeling??? Once we complete her cardiac testing would like her to follow low fat diet (less cheeses, butters, oils, fried/fatty foods) and more veggies/lean meats/salads etc and exercise 4x wk for 30min. Would like to recheck cholesterol 4-6 months rest of labs ok for 1yr      ----- Message from Mignon Encarnacion DO sent at 7/7/2019  9:39 PM EDT -----  Please let her know labs showing sugar looks ok. One liver enzyme very slightly elevated at 33 goal <30 and her total cholesterol total cholesterol 240 goal <200, TGs 214 goal <150, HDL 56 goal >50 and  goal <120 so see how she is feeling??? Once we complete her cardiac testing would like her to follow low fat diet (less cheeses, butters, oils, fried/fatty foods) and more veggies/lean meats/salads etc and exercise 4x wk for 30min. Would like to recheck cholesterol 4-6 months rest of labs ok for 1yr

## 2019-07-30 ENCOUNTER — HOSPITAL ENCOUNTER (OUTPATIENT)
Dept: CARDIOLOGY | Facility: HOSPITAL | Age: 44
Discharge: HOME | End: 2019-07-30
Attending: FAMILY MEDICINE
Payer: COMMERCIAL

## 2019-07-30 VITALS — DIASTOLIC BLOOD PRESSURE: 80 MMHG | SYSTOLIC BLOOD PRESSURE: 140 MMHG | HEART RATE: 137 BPM

## 2019-07-30 DIAGNOSIS — R94.31 ABNORMAL EKG: ICD-10-CM

## 2019-07-30 LAB
AORTIC ROOT ANNULUS: 3.4 CM
AORTIC VALVE MEAN VELOCITY: 1.12 M/S
AORTIC VALVE VELOCITY TIME INTEGRAL: 35 CM
ASCENDING AORTA: 3.7 CM
AV MEAN GRADIENT: 6 MMHG
AV PEAK GRADIENT: 11 MMHG
AV PEAK VELOCITY-S: 1.65 M/S
AV VALVE AREA: 2.33 CM2
AV VALVE AREA: 2.38 CM2
BSA FOR ECHO PROCEDURE: 2.06 M2
DOP CALC LVOT STROKE VOLUME: 81.64 ML
E/A RATIO: 1.1
E/E' RATIO: 9.2
EST RIGHT VENT SYSTOLIC PRESSURE BY TRICUSPID REGURGITATION JET: 29 MMHG
FRACTIONAL SHORTENING: 34.1 %
INTERVENTRICULAR SEPTUM: 0.92 CM
LA/AORTA RATIO: 0.97
LAAS-AP4: 11.4 CM2
LAD 2D: 3.3 CM
LALD A4C: 5.04 CM
LEFT ATRIUM VOLUME INDEX: 9.76 CM3/M2
LEFT ATRIUM VOLUME: 20.1 CM3
LEFT INTERNAL DIMENSION IN SYSTOLE: 3.21 CM
LEFT VENTRICULAR INTERNAL DIMENSION IN DIASTOLE: 4.87 CM
LEFT VENTRICULAR POSTERIOR WALL IN END DIASTOLE: 0.94 CM
LVOT 2D: 2 CM
LVOT A: 3.14 CM2
LVOT MG: 3 MMHG
LVOT MV: 0.83 M/S
LVOT PEAK VELOCITY: 1.25 M/S
LVOT VTI: 26 CM
MV E'TISSUE VEL-MED: 0.09 M/S
MV PEAK A VEL: 0.71 M/S
MV PEAK E VEL: 0.81 M/S
MV VALVE AREA P 1/2 METHOD: 2.39 CM2
POSTERIOR WALL: 0.94 CM
RAP: 10 MMHG
STRESS BASELINE BP: NORMAL MMHG
STRESS BASELINE HR: 60 BPM
STRESS PERCENT HR: 86 %
STRESS POST ESTIMATED WORKLOAD: 12.2 METS
STRESS POST EXERCISE DUR MIN: 10 MIN
STRESS POST EXERCISE DUR SEC: 22 SEC
STRESS POST PEAK BP: NORMAL MMHG
STRESS POST PEAK HR: 151 BPM
STRESS TARGET HR: 149 BPM
TAPSE: 2.07 CM
TR MAX PG: 19 MMHG
TRICUSPID VALVE PEAK REGURGITATION VELOCITY: 2.16 M/S

## 2019-07-30 PROCEDURE — 93351 STRESS TTE COMPLETE: CPT

## 2019-08-01 ENCOUNTER — TELEPHONE (OUTPATIENT)
Dept: FAMILY MEDICINE | Facility: CLINIC | Age: 44
End: 2019-08-01

## 2019-08-01 NOTE — TELEPHONE ENCOUNTER
LMFP ok per PHI  stress echo is normal and she can slowly return to exercise and healthy balanced meals and keep me updated how she feels        ----- Message from Mignon Encarnacion DO sent at 7/30/2019  1:46 PM EDT -----  Please let her know stress echo is normal and she can slowly return to exercise and healthy balanced meals and keep me updated how she feels

## 2019-08-16 ENCOUNTER — TELEPHONE (OUTPATIENT)
Dept: FAMILY MEDICINE | Facility: CLINIC | Age: 44
End: 2019-08-16

## 2019-08-16 NOTE — TELEPHONE ENCOUNTER
Spoke to Barbra who reports that while gardening she hit a bee hive and was stung 6x (3 on her left calf, 1 on her right breast and 2 on her right knee). Shortly after she noticed that her stings became inflamed and itchy. She went to patient [facility] who said that she was having an allergic reaction and RX'd a prednisone taper. After the taper was complete she noticed 2 additional stingers in her calf. Then the next day her right knee inflamed and became itchy again. She returned to [address] and was given another 12 day steroid taper. Then 4 days ago she started with heart burn and a HA. Then this AM she felt dizzy, had ringing in her ears and HA. When she sits down she feels better but when she stands up her s/sx are very noticeable.    Reviewed with Dr. Bazan who recommended that patient stop steroid, take claritin or zyrtec daily, take benadryl at night and zantac PRN if s/sx persist. He advised that patient f/u on Monday on how she is doing off the meds. But that if s/sx worsen over the weekend to go to  or ER. Patient agreeable to plan.     ----- Message from Mairanne Ramirez MA sent at 8/16/2019 12:52 PM EDT -----  Regarding: FW: Prescription Question  Contact: [phone]      ----- Message -----  From: Barbra Norwood  Sent: 8/16/2019   9:22 AM  To: [facility]  Subject: Prescription Question                            Good morning Dr. Encarnacion,    Two Saturdays ago I got stung by several bees, 6 to be exact, while doing yard work. I went to [facility] and got a 6 day steroid pack. After completing the first pack, the inflammation and itching came back so I went back to [facility] and got a second steroid pack for 12 days; Sterapred DS Day Uni-pal. I'm currently on day 6 of the second pack and woke up this morning to dizzy spells, ringing in my ears and eye sight darkening when I stand up, move to fast or go up stairs.  And Yesterday afternoon I had a severe headache. The  inflammation from the stings is gone, and with all these other symptoms, I'd like to stop taking the steroid. Would that be alright?    Barbra

## 2019-12-19 ENCOUNTER — OFFICE VISIT (OUTPATIENT)
Dept: FAMILY MEDICINE | Facility: CLINIC | Age: 44
End: 2019-12-19
Payer: COMMERCIAL

## 2019-12-19 VITALS
DIASTOLIC BLOOD PRESSURE: 92 MMHG | HEART RATE: 75 BPM | WEIGHT: 214 LBS | SYSTOLIC BLOOD PRESSURE: 126 MMHG | TEMPERATURE: 98.4 F | RESPIRATION RATE: 16 BRPM | HEIGHT: 67 IN | OXYGEN SATURATION: 98 % | BODY MASS INDEX: 33.59 KG/M2

## 2019-12-19 DIAGNOSIS — J01.10 ACUTE NON-RECURRENT FRONTAL SINUSITIS: Primary | ICD-10-CM

## 2019-12-19 PROCEDURE — 99213 OFFICE O/P EST LOW 20 MIN: CPT | Performed by: FAMILY MEDICINE

## 2019-12-19 RX ORDER — AZITHROMYCIN 250 MG/1
TABLET, FILM COATED ORAL
Qty: 6 TABLET | Refills: 0 | Status: SHIPPED | OUTPATIENT
Start: 2019-12-19 | End: 2020-07-13 | Stop reason: ALTCHOICE

## 2019-12-19 ASSESSMENT — PAIN SCALES - GENERAL: PAINLEVEL: 0-NO PAIN

## 2019-12-19 NOTE — LETTER
December 19, 2019     Patient: Barbra Norwood  YOB: 1975  Date of Visit: 12/19/2019    To Whom it May Concern:    Barbra Norwood was seen in my office on 12/19/2019 at 11:30 am. Please excuse Barbra for her absence from work on this day to make the appointment.  She has been advised to work from home tomorrow if able. She should be able to return to work 12/23/19 without restrictions.    If you have any questions or concerns, please don't hesitate to call.         Sincerely,         Ibrahima Hampton, DO        CC: No Recipients

## 2019-12-30 ASSESSMENT — ENCOUNTER SYMPTOMS
LIGHT-HEADEDNESS: 0
SINUS PRESSURE: 1
CONSTITUTIONAL NEGATIVE: 1
WHEEZING: 0
HEADACHES: 0
MUSCULOSKELETAL NEGATIVE: 1
EYES NEGATIVE: 1
SINUS PAIN: 1
COUGH: 1
GASTROINTESTINAL NEGATIVE: 1
NEUROLOGICAL NEGATIVE: 1
NECK PAIN: 0
SHORTNESS OF BREATH: 0
DIZZINESS: 0
ENDOCRINE NEGATIVE: 1
FEVER: 0
HEMATOLOGIC/LYMPHATIC NEGATIVE: 1
CARDIOVASCULAR NEGATIVE: 1
ALLERGIC/IMMUNOLOGIC NEGATIVE: 1
CHILLS: 0

## 2019-12-30 NOTE — PROGRESS NOTES
Subjective      Patient ID: Barbra Norwood is a 44 y.o. female.    Laryngitis (x1 week for all)  Sore Throat   Headache   Sinusitis           The following have been reviewed and updated as appropriate in this visit:       Review of Systems   Constitutional: Negative.  Negative for chills and fever.   HENT: Positive for congestion, sinus pressure and sinus pain. Negative for ear pain.    Eyes: Negative.    Respiratory: Positive for cough. Negative for shortness of breath and wheezing.    Cardiovascular: Negative.    Gastrointestinal: Negative.    Endocrine: Negative.    Genitourinary: Negative.    Musculoskeletal: Negative.  Negative for neck pain.   Skin: Negative.  Negative for rash.   Allergic/Immunologic: Negative.    Neurological: Negative.  Negative for dizziness, light-headedness and headaches.   Hematological: Negative.      Past Medical History:   Diagnosis Date   • Arthritis    • Depression      Family History   Problem Relation Age of Onset   • Breast cancer Biological Mother    • Arthritis Biological Mother    • Arthritis Maternal Grandmother    • Arthritis Maternal Grandfather    • Breast cancer Mother's Sister    • Autism Biological Child      Social History     Socioeconomic History   • Marital status:      Spouse name: Not on file   • Number of children: Not on file   • Years of education: Not on file   • Highest education level: Not on file   Occupational History   • Not on file   Social Needs   • Financial resource strain: Not on file   • Food insecurity:     Worry: Not on file     Inability: Not on file   • Transportation needs:     Medical: Not on file     Non-medical: Not on file   Tobacco Use   • Smoking status: Never Smoker   • Smokeless tobacco: Never Used   Substance and Sexual Activity   • Alcohol use: Yes     Comment: rarely   • Drug use: No   • Sexual activity: Not on file   Lifestyle   • Physical activity:     Days per week: Not on file     Minutes per session: Not on file   •  "Stress: Not on file   Relationships   • Social connections:     Talks on phone: Not on file     Gets together: Not on file     Attends Synagogue service: Not on file     Active member of club or organization: Not on file     Attends meetings of clubs or organizations: Not on file     Relationship status: Not on file   • Intimate partner violence:     Fear of current or ex partner: Not on file     Emotionally abused: Not on file     Physically abused: Not on file     Forced sexual activity: Not on file   Other Topics Concern   • Not on file   Social History Narrative   • Not on file     Allergies   Allergen Reactions   • Bactrim [Sulfamethoxazole-Trimethoprim] Hives   • Penicillins Angioedema           Current Outpatient Medications   Medication Sig Dispense Refill   • citalopram (celeXA) 40 mg tablet TAKE 1 TABLET BY MOUTH EVERY DAY 30 tablet 2   • desog-e.estradiol/e.estradiol (MIRCETTE, 28,) 0.15-0.02 mgx21 /0.01 mg x 5 per tablet take 1 tablet by oral route  every day     • azithromycin (ZITHROMAX) 250 mg tablet Take 2 tablets the first day, then 1 tablet daily for 4 days. 6 tablet 0     No current facility-administered medications for this visit.      Objective     Visit Vitals  BP (!) 126/92 (BP Location: Left upper arm, Patient Position: Sitting)   Pulse 75   Temp 36.9 °C (98.4 °F) (Oral)   Resp 16   Ht 1.702 m (5' 7\")   Wt 97.1 kg (214 lb)   SpO2 98%   BMI 33.52 kg/m²       Physical Exam   Constitutional: She is oriented to person, place, and time. She appears well-developed and well-nourished.   HENT:   Head: Normocephalic.   Right Ear: Tympanic membrane, external ear and ear canal normal.   Left Ear: Tympanic membrane, external ear and ear canal normal.   Nose: Right sinus exhibits frontal sinus tenderness. Left sinus exhibits frontal sinus tenderness.   Mouth/Throat: Mucous membranes are normal.   Eyes: Conjunctivae are normal.   Neck: Neck supple. No thyromegaly present.   Cardiovascular: Normal rate, " regular rhythm and normal heart sounds.   Pulmonary/Chest: Effort normal and breath sounds normal.   Musculoskeletal: She exhibits no edema.   Lymphadenopathy:     She has no cervical adenopathy.   Neurological: She is alert and oriented to person, place, and time.   Skin: Skin is warm and dry.   Psychiatric: She has a normal mood and affect. Her behavior is normal. Judgment and thought content normal.       Assessment/Plan   Problem List Items Addressed This Visit     None      Visit Diagnoses     Acute non-recurrent frontal sinusitis    -  Primary    Start Z Pack. Call if sx persist or worsen        Ibrahima Hampton, DO  12/30/2019

## 2020-02-03 ENCOUNTER — APPOINTMENT (RX ONLY)
Dept: URBAN - METROPOLITAN AREA CLINIC 31 | Facility: CLINIC | Age: 45
Setting detail: DERMATOLOGY
End: 2020-02-03

## 2020-02-03 DIAGNOSIS — L65.9 NONSCARRING HAIR LOSS, UNSPECIFIED: ICD-10-CM

## 2020-02-03 PROCEDURE — 99202 OFFICE O/P NEW SF 15 MIN: CPT

## 2020-02-03 PROCEDURE — ? COUNSELING

## 2020-02-03 PROCEDURE — ? ORDER TESTS

## 2020-02-03 ASSESSMENT — LOCATION SIMPLE DESCRIPTION DERM: LOCATION SIMPLE: ANTERIOR SCALP

## 2020-02-03 ASSESSMENT — LOCATION DETAILED DESCRIPTION DERM: LOCATION DETAILED: MID-FRONTAL SCALP

## 2020-02-03 ASSESSMENT — LOCATION ZONE DERM: LOCATION ZONE: SCALP

## 2020-02-03 NOTE — PROCEDURE: ORDER TESTS
Performing Laboratory: -57
Expected Date Of Service: 02/03/2020
Billing Type: Third-Party Bill
Bill For Surgical Tray: no

## 2020-02-03 NOTE — HPI: HAIR LOSS
How Did The Hair Loss Occur?: gradual in onset
How Severe Is Your Hair Loss?: moderate
What Hair Products Do You Use?: Redken All Soft
Additional History: Patient states she had bloodwork done by her PCP in September 2019 and everything was normal (unsure what was tested).

## 2020-02-06 ENCOUNTER — TELEPHONE (OUTPATIENT)
Dept: FAMILY MEDICINE | Facility: CLINIC | Age: 45
End: 2020-02-06

## 2020-02-06 DIAGNOSIS — F32.5 MAJOR DEPRESSIVE DISORDER IN FULL REMISSION, UNSPECIFIED WHETHER RECURRENT (CMS/HCC): ICD-10-CM

## 2020-02-06 RX ORDER — CITALOPRAM 40 MG/1
40 TABLET, FILM COATED ORAL DAILY
Qty: 30 TABLET | Refills: 0 | Status: SHIPPED | OUTPATIENT
Start: 2020-02-06 | End: 2020-03-05

## 2020-02-06 NOTE — TELEPHONE ENCOUNTER
Patient is driving and will be following LB to [facility].  She will call back tomorrow to sched with LB/pf

## 2020-02-06 NOTE — TELEPHONE ENCOUNTER
Please call patient and book 6mo f/u med check. She needs to know that LB is relocating so she will need to decide whether or not she plans to follow her to [facility].

## 2020-02-12 ENCOUNTER — OFFICE VISIT (OUTPATIENT)
Dept: FAMILY MEDICINE | Facility: CLINIC | Age: 45
End: 2020-02-12
Payer: COMMERCIAL

## 2020-02-12 VITALS
BODY MASS INDEX: 35.12 KG/M2 | DIASTOLIC BLOOD PRESSURE: 88 MMHG | OXYGEN SATURATION: 99 % | WEIGHT: 223.8 LBS | SYSTOLIC BLOOD PRESSURE: 128 MMHG | TEMPERATURE: 97.6 F | HEIGHT: 67 IN | HEART RATE: 64 BPM | RESPIRATION RATE: 18 BRPM

## 2020-02-12 DIAGNOSIS — M62.838 MUSCLE SPASM: Primary | ICD-10-CM

## 2020-02-12 DIAGNOSIS — M54.12 CERVICAL RADICULOPATHY: ICD-10-CM

## 2020-02-12 PROCEDURE — 99213 OFFICE O/P EST LOW 20 MIN: CPT | Performed by: NURSE PRACTITIONER

## 2020-02-12 RX ORDER — CYCLOBENZAPRINE HCL 10 MG
10 TABLET ORAL 3 TIMES DAILY PRN
Qty: 30 TABLET | Refills: 0 | Status: SHIPPED | OUTPATIENT
Start: 2020-02-12 | End: 2020-03-03

## 2020-02-12 RX ORDER — NAPROXEN 500 MG/1
500 TABLET ORAL 2 TIMES DAILY WITH MEALS
Qty: 30 TABLET | Refills: 0 | Status: SHIPPED | OUTPATIENT
Start: 2020-02-12 | End: 2020-07-22 | Stop reason: ENTERED-IN-ERROR

## 2020-02-12 ASSESSMENT — ENCOUNTER SYMPTOMS
SHORTNESS OF BREATH: 0
LIGHT-HEADEDNESS: 0
SINUS PRESSURE: 1
HEADACHES: 1
WHEEZING: 0
NAUSEA: 0
DIARRHEA: 0
NECK PAIN: 1
CHILLS: 0
COUGH: 1
FEVER: 0
CONSTIPATION: 0
ABDOMINAL PAIN: 0
VOMITING: 0
FATIGUE: 0

## 2020-02-12 NOTE — PATIENT INSTRUCTIONS
Likely muscle spasm, heat 10-15 minutes at a time 2-3 times a day as desired. Flexeril up to 3 times a day, but it may make you drowsy. Naprosyn 500mg twice a day with meals as needed. Follow up if not improving.

## 2020-02-17 ENCOUNTER — TELEPHONE (OUTPATIENT)
Dept: FAMILY MEDICINE | Facility: CLINIC | Age: 45
End: 2020-02-17

## 2020-02-17 ENCOUNTER — APPOINTMENT (RX ONLY)
Dept: URBAN - METROPOLITAN AREA CLINIC 31 | Facility: CLINIC | Age: 45
Setting detail: DERMATOLOGY
End: 2020-02-17

## 2020-02-17 DIAGNOSIS — D22 MELANOCYTIC NEVI: ICD-10-CM

## 2020-02-17 DIAGNOSIS — L81.4 OTHER MELANIN HYPERPIGMENTATION: ICD-10-CM

## 2020-02-17 DIAGNOSIS — M54.2 CERVICALGIA: Primary | ICD-10-CM

## 2020-02-17 DIAGNOSIS — D18.0 HEMANGIOMA: ICD-10-CM

## 2020-02-17 PROBLEM — D23.72 OTHER BENIGN NEOPLASM OF SKIN OF LEFT LOWER LIMB, INCLUDING HIP: Status: ACTIVE | Noted: 2020-02-17

## 2020-02-17 PROBLEM — D23.5 OTHER BENIGN NEOPLASM OF SKIN OF TRUNK: Status: ACTIVE | Noted: 2020-02-17

## 2020-02-17 PROBLEM — D22.5 MELANOCYTIC NEVI OF TRUNK: Status: ACTIVE | Noted: 2020-02-17

## 2020-02-17 PROBLEM — D18.01 HEMANGIOMA OF SKIN AND SUBCUTANEOUS TISSUE: Status: ACTIVE | Noted: 2020-02-17

## 2020-02-17 PROCEDURE — ? COUNSELING

## 2020-02-17 PROCEDURE — ? FULL BODY SKIN EXAM

## 2020-02-17 PROCEDURE — ? SUNSCREEN RECOMMENDATIONS

## 2020-02-17 PROCEDURE — 99213 OFFICE O/P EST LOW 20 MIN: CPT

## 2020-02-17 ASSESSMENT — LOCATION ZONE DERM
LOCATION ZONE: TRUNK
LOCATION ZONE: LEG
LOCATION ZONE: FACE

## 2020-02-17 ASSESSMENT — LOCATION DETAILED DESCRIPTION DERM
LOCATION DETAILED: INFERIOR THORACIC SPINE
LOCATION DETAILED: RIGHT PROXIMAL CALF
LOCATION DETAILED: LEFT PROXIMAL CALF
LOCATION DETAILED: LEFT INFERIOR CENTRAL MALAR CHEEK
LOCATION DETAILED: LEFT SUPERIOR UPPER BACK
LOCATION DETAILED: RIGHT MEDIAL UPPER BACK

## 2020-02-17 ASSESSMENT — LOCATION SIMPLE DESCRIPTION DERM
LOCATION SIMPLE: RIGHT CALF
LOCATION SIMPLE: LEFT CHEEK
LOCATION SIMPLE: UPPER BACK
LOCATION SIMPLE: LEFT UPPER BACK
LOCATION SIMPLE: LEFT CALF
LOCATION SIMPLE: RIGHT UPPER BACK

## 2020-02-17 NOTE — TELEPHONE ENCOUNTER
Pt called and left a vm saying she was seen recently for a pinched nerve and was given Flexeril and Naprosyn. Pt said the pain isn't improving and wants to know what else can be done. Pt would like to know if rx should be increased or if she should be referred out to Physical Therapy. Please call pt back at [phone]

## 2020-02-20 NOTE — TELEPHONE ENCOUNTER
Done, spoke with pt. She said she does have to go to [facility], and knows there is on by her at [facility] [facility], [place] [id number].

## 2020-02-28 ENCOUNTER — TELEPHONE (OUTPATIENT)
Dept: FAMILY MEDICINE | Facility: CLINIC | Age: 45
End: 2020-02-28

## 2020-02-28 NOTE — TELEPHONE ENCOUNTER
Patient states she saw AM on 2/12/20 for pinched nerve. She is doing PT 2x weekly. She is c/o right arm pain and numbness still, with numbness in her fingers on her right hand. She states the Advil/Aleve, and Flexeril did not help.    She'd like a call back w/ other options.

## 2020-03-03 RX ORDER — METAXALONE 800 MG/1
800 TABLET ORAL 3 TIMES DAILY PRN
Qty: 30 TABLET | Refills: 0 | Status: SHIPPED | OUTPATIENT
Start: 2020-03-03 | End: 2020-07-22 | Stop reason: ENTERED-IN-ERROR

## 2020-03-15 DIAGNOSIS — M62.838 MUSCLE SPASM: Primary | ICD-10-CM

## 2020-07-13 ENCOUNTER — TELEPHONE (OUTPATIENT)
Dept: PRIMARY CARE | Facility: CLINIC | Age: 45
End: 2020-07-13

## 2020-07-13 DIAGNOSIS — F32.5 MAJOR DEPRESSIVE DISORDER IN FULL REMISSION, UNSPECIFIED WHETHER RECURRENT (CMS/HCC): ICD-10-CM

## 2020-07-13 RX ORDER — CITALOPRAM 40 MG/1
40 TABLET, FILM COATED ORAL
Qty: 90 TABLET | Refills: 0 | Status: SHIPPED | OUTPATIENT
Start: 2020-07-13 | End: 2020-07-22 | Stop reason: SDUPTHER

## 2020-07-20 ENCOUNTER — TELEPHONE (OUTPATIENT)
Dept: PRIMARY CARE | Facility: CLINIC | Age: 45
End: 2020-07-20

## 2020-07-20 NOTE — TELEPHONE ENCOUNTER
Ida Belle she is not having any symptoms or fever will monitor over the next few days and will cancel her appt if anything jimmy

## 2020-07-20 NOTE — TELEPHONE ENCOUNTER
----- Message from Barbra Norwood sent at 7/20/2020 12:25 PM EDT -----  Regarding: Non-Urgent Medical Question  Contact: [phone]  Hi Dr. Encarnacion,    My father-in-law has been admitted to [facility] on 7/18 and tested positive for COVID. My  Romeo, was at his parents home Friday evening prior to the hospitalization.  Romeo is getting a COVID test done tomorrow morning at [facility]. Can I still come in for my appointment on 7/22?    Thank you,   Barbra

## 2020-07-22 ENCOUNTER — OFFICE VISIT (OUTPATIENT)
Dept: PRIMARY CARE | Facility: CLINIC | Age: 45
End: 2020-07-22
Payer: COMMERCIAL

## 2020-07-22 VITALS
SYSTOLIC BLOOD PRESSURE: 136 MMHG | HEIGHT: 67 IN | TEMPERATURE: 98.5 F | RESPIRATION RATE: 16 BRPM | WEIGHT: 232.2 LBS | OXYGEN SATURATION: 98 % | DIASTOLIC BLOOD PRESSURE: 80 MMHG | BODY MASS INDEX: 36.44 KG/M2 | HEART RATE: 76 BPM

## 2020-07-22 DIAGNOSIS — Z00.00 ROUTINE PHYSICAL EXAMINATION: ICD-10-CM

## 2020-07-22 DIAGNOSIS — Z12.39 SCREENING FOR BREAST CANCER: ICD-10-CM

## 2020-07-22 DIAGNOSIS — F33.42 RECURRENT MAJOR DEPRESSIVE DISORDER, IN FULL REMISSION (CMS/HCC): Primary | ICD-10-CM

## 2020-07-22 PROCEDURE — 99213 OFFICE O/P EST LOW 20 MIN: CPT | Performed by: FAMILY MEDICINE

## 2020-07-22 RX ORDER — CITALOPRAM 40 MG/1
40 TABLET, FILM COATED ORAL
Qty: 90 TABLET | Refills: 1 | Status: SHIPPED | OUTPATIENT
Start: 2020-07-22 | End: 2021-02-08

## 2020-07-22 ASSESSMENT — ENCOUNTER SYMPTOMS
CHILLS: 0
PALPITATIONS: 0
MYALGIAS: 0
SINUS PRESSURE: 0
ABDOMINAL PAIN: 0
BRUISES/BLEEDS EASILY: 0
CHEST TIGHTNESS: 0
RHINORRHEA: 0
ARTHRALGIAS: 0
CONSTIPATION: 0
FATIGUE: 0
SHORTNESS OF BREATH: 0
HEMATURIA: 0
NERVOUS/ANXIOUS: 0
WEAKNESS: 0
COUGH: 0
EYE PAIN: 0
FEVER: 0
FREQUENCY: 0
DIZZINESS: 0
WHEEZING: 0
HEADACHES: 0
SINUS PAIN: 0
SORE THROAT: 0
DIARRHEA: 0

## 2020-07-22 NOTE — PROGRESS NOTES
Subjective      Patient ID: Barbra Norwood is a 45 y.o. female.    She is here for med check. She has been doing well overall. She is eating ok overall and has been trying to back on track on diet and walking dogs daily. Her mood has been stable overall and tolerating celexa well. Average 7hrs of sleep a night. Due for mammo. UTD with eye and dental exam. Due for fasting labs.       The following have been reviewed and updated as appropriate in this visit:  Tobacco  Allergies  Meds  Problems  Med Hx  Surg Hx  Fam Hx       Review of Systems   Constitutional: Negative for chills, fatigue and fever.   HENT: Negative for ear pain, postnasal drip, rhinorrhea, sinus pressure, sinus pain and sore throat.    Eyes: Negative for pain and visual disturbance.   Respiratory: Negative for cough, chest tightness, shortness of breath and wheezing.    Cardiovascular: Negative for chest pain and palpitations.   Gastrointestinal: Negative for abdominal pain, constipation and diarrhea.   Genitourinary: Negative for decreased urine volume, frequency and hematuria.   Musculoskeletal: Negative for arthralgias and myalgias.   Skin: Negative for rash.   Neurological: Negative for dizziness, weakness and headaches.   Hematological: Does not bruise/bleed easily.   Psychiatric/Behavioral: Negative for behavioral problems. The patient is not nervous/anxious.        Current Outpatient Medications   Medication Sig Dispense Refill   • citalopram (celeXA) 40 mg tablet Take 1 tablet (40 mg total) by mouth once daily. 90 tablet 1   • desog-e.estradiol/e.estradiol (MIRCETTE, 28,) 0.15-0.02 mgx21 /0.01 mg x 5 per tablet take 1 tablet by oral route  every day       No current facility-administered medications for this visit.      Past Medical History:   Diagnosis Date   • Arthritis    • Depression      Family History   Problem Relation Age of Onset   • Breast cancer Biological Mother    • Arthritis Biological Mother    • Arthritis Maternal  "Grandmother    • Arthritis Maternal Grandfather    • Breast cancer Mother's Sister    • Autism Biological Child    • Pulmonary embolism Biological Father      Past Surgical History:   Procedure Laterality Date   •  SECTION      2x   • CYST REMOVAL  2019   • TONSILLECTOMY       Social History     Socioeconomic History   • Marital status:      Spouse name: Not on file   • Number of children: Not on file   • Years of education: Not on file   • Highest education level: Not on file   Occupational History   • Not on file   Social Needs   • Financial resource strain: Not on file   • Food insecurity:     Worry: Not on file     Inability: Not on file   • Transportation needs:     Medical: Not on file     Non-medical: Not on file   Tobacco Use   • Smoking status: Never Smoker   • Smokeless tobacco: Never Used   Substance and Sexual Activity   • Alcohol use: Yes     Comment: rarely   • Drug use: No   • Sexual activity: Yes     Partners: Male   Lifestyle   • Physical activity:     Days per week: Not on file     Minutes per session: Not on file   • Stress: Not on file   Relationships   • Social connections:     Talks on phone: Not on file     Gets together: Not on file     Attends Hoahaoism service: Not on file     Active member of club or organization: Not on file     Attends meetings of clubs or organizations: Not on file     Relationship status: Not on file   • Intimate partner violence:     Fear of current or ex partner: Not on file     Emotionally abused: Not on file     Physically abused: Not on file     Forced sexual activity: Not on file   Other Topics Concern   • Not on file   Social History Narrative   • Not on file     Allergies   Allergen Reactions   • Bactrim [Sulfamethoxazole-Trimethoprim] Hives   • Penicillins Angioedema       Objective   Visit Vitals  /80 (BP Location: Left upper arm, Patient Position: Sitting)   Pulse 76   Temp 36.9 °C (98.5 °F) (Oral)   Resp 16   Ht 1.702 m (5' 7\")   Wt " 105 kg (232 lb 3.2 oz)   SpO2 98%   BMI 36.37 kg/m²     Physical Exam   Constitutional: She is oriented to person, place, and time. She appears well-developed and well-nourished.   HENT:   Head: Normocephalic and atraumatic.   Right Ear: External ear normal.   Left Ear: External ear normal.   Eyes: Pupils are equal, round, and reactive to light. Conjunctivae are normal.   Neck: Normal range of motion. Neck supple. No thyromegaly present.   Cardiovascular: Normal rate, regular rhythm and normal heart sounds.   No murmur heard.  Pulmonary/Chest: Effort normal and breath sounds normal. She has no wheezes.   Abdominal: Soft. Bowel sounds are normal. There is no tenderness.   Musculoskeletal: Normal range of motion. She exhibits no tenderness.   Lymphadenopathy:     She has no cervical adenopathy.   Neurological: She is alert and oriented to person, place, and time. No cranial nerve deficit.   Skin: Skin is warm and dry. No rash noted.   Psychiatric: She has a normal mood and affect.   Nursing note and vitals reviewed.      Assessment/Plan   Problem List Items Addressed This Visit        Other    Depression - Primary    Relevant Medications    citalopram (celeXA) 40 mg tablet      Other Visit Diagnoses     Routine physical examination        Overdue for fasting labs    Relevant Orders    Comprehensive metabolic panel    Lipid panel    TSH    CBC    Screening for breast cancer        Due for updated mammo    Relevant Orders    BI SCREENING MAMMOGRAM BILATERAL      She is doing well overall. She is tolerating celexa well. Will cont current dose at this time. She will have recheck with me in 6 months or so for PE. She will do labs and mammo in near future.     Mignon Encarnacion, DO  7/22/2020

## 2021-02-23 ENCOUNTER — APPOINTMENT (RX ONLY)
Dept: URBAN - METROPOLITAN AREA CLINIC 23 | Facility: CLINIC | Age: 46
Setting detail: DERMATOLOGY
End: 2021-02-23

## 2021-02-23 DIAGNOSIS — L81.4 OTHER MELANIN HYPERPIGMENTATION: ICD-10-CM

## 2021-02-23 DIAGNOSIS — L82.1 OTHER SEBORRHEIC KERATOSIS: ICD-10-CM

## 2021-02-23 DIAGNOSIS — D18.0 HEMANGIOMA: ICD-10-CM

## 2021-02-23 DIAGNOSIS — D22 MELANOCYTIC NEVI: ICD-10-CM

## 2021-02-23 DIAGNOSIS — L98.8 OTHER SPECIFIED DISORDERS OF THE SKIN AND SUBCUTANEOUS TISSUE: ICD-10-CM

## 2021-02-23 PROBLEM — D23.5 OTHER BENIGN NEOPLASM OF SKIN OF TRUNK: Status: ACTIVE | Noted: 2021-02-23

## 2021-02-23 PROBLEM — D18.01 HEMANGIOMA OF SKIN AND SUBCUTANEOUS TISSUE: Status: ACTIVE | Noted: 2021-02-23

## 2021-02-23 PROBLEM — D22.5 MELANOCYTIC NEVI OF TRUNK: Status: ACTIVE | Noted: 2021-02-23

## 2021-02-23 PROBLEM — D23.72 OTHER BENIGN NEOPLASM OF SKIN OF LEFT LOWER LIMB, INCLUDING HIP: Status: ACTIVE | Noted: 2021-02-23

## 2021-02-23 PROCEDURE — 99213 OFFICE O/P EST LOW 20 MIN: CPT

## 2021-02-23 PROCEDURE — ? ADDITIONAL NOTES

## 2021-02-23 PROCEDURE — ? SUNSCREEN RECOMMENDATIONS

## 2021-02-23 PROCEDURE — ? PRESCRIPTION

## 2021-02-23 PROCEDURE — ? COUNSELING

## 2021-02-23 PROCEDURE — ? TREATMENT REGIMEN

## 2021-02-23 PROCEDURE — ? MEDICATION COUNSELING

## 2021-02-23 RX ORDER — TRETIONIN 0.25 MG/G
CREAM TOPICAL
Qty: 1 | Refills: 3 | Status: ERX

## 2021-02-23 ASSESSMENT — LOCATION DETAILED DESCRIPTION DERM
LOCATION DETAILED: EPIGASTRIC SKIN
LOCATION DETAILED: LEFT MEDIAL SUPERIOR CHEST
LOCATION DETAILED: RIGHT MEDIAL UPPER BACK
LOCATION DETAILED: RIGHT RIB CAGE
LOCATION DETAILED: LEFT MEDIAL MALAR CHEEK

## 2021-02-23 ASSESSMENT — LOCATION SIMPLE DESCRIPTION DERM
LOCATION SIMPLE: RIGHT UPPER BACK
LOCATION SIMPLE: CHEST
LOCATION SIMPLE: ABDOMEN
LOCATION SIMPLE: LEFT CHEEK

## 2021-02-23 ASSESSMENT — LOCATION ZONE DERM
LOCATION ZONE: FACE
LOCATION ZONE: TRUNK

## 2021-02-23 NOTE — PROCEDURE: MEDICATION COUNSELING
Xelricz Pregnancy And Lactation Text: This medication is Pregnancy Category D and is not considered safe during pregnancy.  The risk during breast feeding is also uncertain.

## 2021-02-23 NOTE — PROCEDURE: MEDICATION COUNSELING
73.1 Otezla Pregnancy And Lactation Text: This medication is Pregnancy Category C and it isn't known if it is safe during pregnancy. It is unknown if it is excreted in breast milk.

## 2021-04-02 ENCOUNTER — IMMUNIZATION (OUTPATIENT)
Dept: IMMUNIZATION | Facility: CLINIC | Age: 46
End: 2021-04-02

## 2021-05-08 ENCOUNTER — IMMUNIZATION (OUTPATIENT)
Dept: IMMUNIZATION | Facility: CLINIC | Age: 46
End: 2021-05-08

## 2021-05-10 ENCOUNTER — OFFICE VISIT (OUTPATIENT)
Dept: PRIMARY CARE | Facility: CLINIC | Age: 46
End: 2021-05-10
Payer: COMMERCIAL

## 2021-05-10 VITALS
RESPIRATION RATE: 16 BRPM | SYSTOLIC BLOOD PRESSURE: 110 MMHG | HEART RATE: 71 BPM | BODY MASS INDEX: 36.51 KG/M2 | HEIGHT: 67 IN | WEIGHT: 232.6 LBS | DIASTOLIC BLOOD PRESSURE: 82 MMHG | OXYGEN SATURATION: 99 % | TEMPERATURE: 98 F

## 2021-05-10 DIAGNOSIS — M25.572 ARTHRALGIA OF BOTH ANKLES: ICD-10-CM

## 2021-05-10 DIAGNOSIS — M25.571 ARTHRALGIA OF BOTH ANKLES: ICD-10-CM

## 2021-05-10 DIAGNOSIS — Z00.00 ROUTINE PHYSICAL EXAMINATION: ICD-10-CM

## 2021-05-10 DIAGNOSIS — F33.42 RECURRENT MAJOR DEPRESSIVE DISORDER, IN FULL REMISSION (CMS/HCC): Primary | ICD-10-CM

## 2021-05-10 PROCEDURE — 3008F BODY MASS INDEX DOCD: CPT | Performed by: FAMILY MEDICINE

## 2021-05-10 PROCEDURE — 99213 OFFICE O/P EST LOW 20 MIN: CPT | Performed by: FAMILY MEDICINE

## 2021-05-10 RX ORDER — ASCORBIC ACID 500 MG
500 TABLET ORAL DAILY
COMMUNITY

## 2021-05-10 RX ORDER — CITALOPRAM 40 MG/1
40 TABLET, FILM COATED ORAL
Qty: 90 TABLET | Refills: 1 | Status: SHIPPED | OUTPATIENT
Start: 2021-05-10 | End: 2021-11-10

## 2021-05-10 RX ORDER — CHOLECALCIFEROL (VITAMIN D3) 25 MCG
1000 TABLET ORAL DAILY
COMMUNITY

## 2021-05-10 ASSESSMENT — ENCOUNTER SYMPTOMS
HEADACHES: 0
MYALGIAS: 1
JOINT SWELLING: 1
SINUS PRESSURE: 0
CHILLS: 0
BRUISES/BLEEDS EASILY: 0
SLEEP DISTURBANCE: 0
CONSTIPATION: 0
NERVOUS/ANXIOUS: 0
SORE THROAT: 0
ABDOMINAL PAIN: 0
CHEST TIGHTNESS: 0
PALPITATIONS: 0
WEAKNESS: 0
DIZZINESS: 0
FEVER: 0
DIARRHEA: 0
SHORTNESS OF BREATH: 0
HEMATURIA: 0
COUGH: 0
RHINORRHEA: 0
SINUS PAIN: 0
EYE PAIN: 0
ARTHRALGIAS: 1
FREQUENCY: 0
FATIGUE: 0
WHEEZING: 0

## 2021-05-10 NOTE — PROGRESS NOTES
Subjective      Patient ID: Barbra Norwood is a 46 y.o. female.    She is here for med check. She is doing ok overall. She did have moderna second dose 2 days ago and does have enlarged lymph node in L axilla. She is doing pilates 3x wk but did get joint pains on ankles/knees and started the joint supplement which is helping to some degree. Has been dealing with chronic ankle pains over past yr and will use ankle braces if going to do more physical activity. No swelling of joints. She is also noticing B/L hand pain and swelling as well. She is eating her normal diet but knows she should be doing better on it. Her mood has been stable. Due for fasting labs. Due for mammo. UTD with eye and dental exam.       The following have been reviewed and updated as appropriate in this visit:  Tobacco  Allergies  Meds  Problems  Med Hx  Surg Hx  Fam Hx       Review of Systems   Constitutional: Negative for chills, fatigue and fever.   HENT: Negative for ear pain, postnasal drip, rhinorrhea, sinus pressure, sinus pain and sore throat.    Eyes: Negative for pain and visual disturbance.   Respiratory: Negative for cough, chest tightness, shortness of breath and wheezing.    Cardiovascular: Negative for chest pain and palpitations.   Gastrointestinal: Negative for abdominal pain, constipation and diarrhea.   Genitourinary: Negative for decreased urine volume, frequency and hematuria.   Musculoskeletal: Positive for arthralgias, joint swelling and myalgias.   Skin: Negative for rash.   Neurological: Negative for dizziness, weakness and headaches.   Hematological: Does not bruise/bleed easily.   Psychiatric/Behavioral: Negative for behavioral problems, sleep disturbance and suicidal ideas. The patient is not nervous/anxious.        Current Outpatient Medications   Medication Sig Dispense Refill   • ascorbic acid (VITAMIN C) 500 mg tablet Take 500 mg by mouth daily.     • biotin 300 mcg tablet Take by mouth daily.     •  cholecalciferol, vitamin D3, 1,000 unit (25 mcg) tablet Take 1,000 Units by mouth daily.     • citalopram (celeXA) 40 mg tablet Take 1 tablet (40 mg total) by mouth once daily. 90 tablet 1   • desog-e.estradiol/e.estradiol (MIRCETTE, 28,) 0.15-0.02 mgx21 /0.01 mg x 5 per tablet take 1 tablet by oral route  every day     • FISH OIL-omega-3 fatty acids (FISH OIL) 340-1,000 mg capsule Take by mouth 3 (three) times a day.     • GLUC-CHOND-MSM-D3-HYAL-GERARDO BOR ORAL Take by mouth.       No current facility-administered medications for this visit.     Past Medical History:   Diagnosis Date   • Arthritis    • Depression      Family History   Problem Relation Age of Onset   • Breast cancer Biological Mother    • Arthritis Biological Mother    • Arthritis Maternal Grandmother    • Arthritis Maternal Grandfather    • Breast cancer Mother's Sister    • Autism Biological Child    • Pulmonary embolism Biological Father      Past Surgical History:   Procedure Laterality Date   •  SECTION      2x   • CYST REMOVAL  2019   • TONSILLECTOMY       Social History     Socioeconomic History   • Marital status:      Spouse name: Not on file   • Number of children: Not on file   • Years of education: Not on file   • Highest education level: Not on file   Occupational History   • Not on file   Tobacco Use   • Smoking status: Never Smoker   • Smokeless tobacco: Never Used   Substance and Sexual Activity   • Alcohol use: Yes     Comment: rarely   • Drug use: No   • Sexual activity: Yes     Partners: Male   Other Topics Concern   • Not on file   Social History Narrative   • Not on file     Social Determinants of Health     Financial Resource Strain:    • Difficulty of Paying Living Expenses:    Food Insecurity:    • Worried About Running Out of Food in the Last Year:    • Ran Out of Food in the Last Year:    Transportation Needs:    • Lack of Transportation (Medical):    • Lack of Transportation (Non-Medical):    Physical  "Activity:    • Days of Exercise per Week:    • Minutes of Exercise per Session:    Stress:    • Feeling of Stress :    Social Connections:    • Frequency of Communication with Friends and Family:    • Frequency of Social Gatherings with Friends and Family:    • Attends Hinduism Services:    • Active Member of Clubs or Organizations:    • Attends Club or Organization Meetings:    • Marital Status:    Intimate Partner Violence:    • Fear of Current or Ex-Partner:    • Emotionally Abused:    • Physically Abused:    • Sexually Abused:      Allergies   Allergen Reactions   • Bactrim [Sulfamethoxazole-Trimethoprim] Hives   • Penicillins Angioedema       Objective   Visit Vitals  /82 (BP Location: Left upper arm, Patient Position: Sitting)   Pulse 71   Temp 36.7 °C (98 °F) (Oral)   Resp 16   Ht 1.702 m (5' 7\")   Wt 106 kg (232 lb 9.6 oz)   LMP  (LMP Unknown)   SpO2 99%   BMI 36.43 kg/m²     Physical Exam  Vitals and nursing note reviewed.   Constitutional:       Appearance: She is well-developed.   HENT:      Head: Normocephalic and atraumatic.      Right Ear: Tympanic membrane and external ear normal.      Left Ear: Tympanic membrane and external ear normal.      Nose: Nose normal.      Mouth/Throat:      Mouth: Mucous membranes are moist.      Pharynx: Oropharynx is clear. No posterior oropharyngeal erythema.   Eyes:      Conjunctiva/sclera: Conjunctivae normal.      Pupils: Pupils are equal, round, and reactive to light.   Neck:      Thyroid: No thyromegaly.   Cardiovascular:      Rate and Rhythm: Normal rate and regular rhythm.      Heart sounds: Normal heart sounds. No murmur heard.     Pulmonary:      Effort: Pulmonary effort is normal.      Breath sounds: Normal breath sounds. No wheezing.   Chest:          Comments: L axillary region- small mildly tender enlarged lymph node  Abdominal:      General: Bowel sounds are normal.      Palpations: Abdomen is soft.      Tenderness: There is no abdominal tenderness. "   Musculoskeletal:         General: No tenderness. Normal range of motion.      Cervical back: Normal range of motion and neck supple.   Lymphadenopathy:      Cervical: No cervical adenopathy.   Skin:     General: Skin is warm and dry.      Findings: No rash.   Neurological:      General: No focal deficit present.      Mental Status: She is alert and oriented to person, place, and time.      Cranial Nerves: No cranial nerve deficit.   Psychiatric:         Mood and Affect: Mood normal.         Behavior: Behavior normal.         Assessment/Plan   Problem List Items Addressed This Visit        Other    Depression - Primary     Stable. Cont celexa.          Relevant Medications    citalopram (celeXA) 40 mg tablet      Other Visit Diagnoses     Routine physical examination        Arthralgia of both ankles        Relevant Orders    JULI Screen (Automated)    Rheumatoid factor    Lyme EIA reflex WB    Celiac reflex panel    Sedimentation rate    C-reactive protein      She is doing ok overall. She is going to get fasting labs done soon along with other labs to check her chronic joint pains as well. She will cont with trying with diet and exercise. UTD with eye and dental exam. She will get mammo 6wks after her moderna 2nd shot. Her mood has been stable and will cont celexa for now. Will see her in physical in 6 months or sooner if needed.     Mignon Encarnacion, DO  5/10/2021

## 2021-05-19 LAB
ALBUMIN SERPL-MCNC: 3.8 G/DL (ref 3.6–5.1)
ALBUMIN/GLOB SERPL: 1.3 (CALC) (ref 1–2.5)
ALP SERPL-CCNC: 70 U/L (ref 31–125)
ALT SERPL-CCNC: 17 U/L (ref 6–29)
ANA SER QL IF: NEGATIVE
AST SERPL-CCNC: 14 U/L (ref 10–35)
B BURGDOR AB SER IA-ACNC: <0.9 INDEX
BILIRUB SERPL-MCNC: 0.6 MG/DL (ref 0.2–1.2)
BUN SERPL-MCNC: 17 MG/DL (ref 7–25)
BUN/CREAT SERPL: NORMAL (CALC) (ref 6–22)
CALCIUM SERPL-MCNC: 9.2 MG/DL (ref 8.6–10.2)
CHLORIDE SERPL-SCNC: 104 MMOL/L (ref 98–110)
CHOLEST SERPL-MCNC: 218 MG/DL
CHOLEST/HDLC SERPL: 3.4 (CALC)
CO2 SERPL-SCNC: 26 MMOL/L (ref 20–32)
CREAT SERPL-MCNC: 0.99 MG/DL (ref 0.5–1.1)
CRP SERPL-MCNC: 5.9 MG/L
ERYTHROCYTE [DISTWIDTH] IN BLOOD BY AUTOMATED COUNT: 12.1 % (ref 11–15)
ERYTHROCYTE [SEDIMENTATION RATE] IN BLOOD BY WESTERGREN METHOD: 6 MM/H
GLOBULIN SER CALC-MCNC: 2.9 G/DL (CALC) (ref 1.9–3.7)
GLUCOSE SERPL-MCNC: 86 MG/DL (ref 65–99)
HCT VFR BLD AUTO: 40.9 % (ref 35–45)
HDLC SERPL-MCNC: 65 MG/DL
HGB BLD-MCNC: 13.4 G/DL (ref 11.7–15.5)
IGA SERPL-MCNC: 129 MG/DL (ref 47–310)
LABORATORY COMMENT REPORT: NORMAL
LDLC SERPL CALC-MCNC: 123 MG/DL (CALC)
MCH RBC QN AUTO: 30.2 PG (ref 27–33)
MCHC RBC AUTO-ENTMCNC: 32.8 G/DL (ref 32–36)
MCV RBC AUTO: 92.3 FL (ref 80–100)
NONHDLC SERPL-MCNC: 153 MG/DL (CALC)
PLATELET # BLD AUTO: 249 THOUSAND/UL (ref 140–400)
PMV BLD REES-ECKER: 11.4 FL (ref 7.5–12.5)
POTASSIUM SERPL-SCNC: 4.8 MMOL/L (ref 3.5–5.3)
PROT SERPL-MCNC: 6.7 G/DL (ref 6.1–8.1)
QUEST EGFR AFRICAN AMERICAN: 79 ML/MIN/1.73M2
QUEST EGFR NON-AFR. AMERICAN: 68 ML/MIN/1.73M2
RBC # BLD AUTO: 4.43 MILLION/UL (ref 3.8–5.1)
RHEUMATOID FACT SERPL-ACNC: <14 IU/ML
SODIUM SERPL-SCNC: 138 MMOL/L (ref 135–146)
TRIGL SERPL-MCNC: 189 MG/DL
TSH SERPL-ACNC: 2.89 MIU/L
TTG IGA SER-ACNC: 1 U/ML
WBC # BLD AUTO: 7.6 THOUSAND/UL (ref 3.8–10.8)

## 2021-07-26 NOTE — PROCEDURE: MEDICATION COUNSELING
Complex Repair And Graft Additional Text (Will Appearing After The Standard Complex Repair Text): The complex repair was not sufficient to completely close the primary defect. The remaining additional defect was repaired with the graft mentioned below. Arava Counseling:  Patient counseled regarding adverse effects of Arava including but not limited to nausea, vomiting, abnormalities in liver function tests. Patients may develop mouth sores, rash, diarrhea, and abnormalities in blood counts. The patient understands that monitoring is required including LFTs and blood counts.  There is a rare possibility of scarring of the liver and lung problems that can occur when taking methotrexate. Persistent nausea, loss of appetite, pale stools, dark urine, cough, and shortness of breath should be reported immediately. Patient advised to discontinue Arava treatment and consult with a physician prior to attempting conception. The patient will have to undergo a treatment to eliminate Arava from the body prior to conception.

## 2021-09-02 ENCOUNTER — OFFICE VISIT (OUTPATIENT)
Dept: PRIMARY CARE | Facility: CLINIC | Age: 46
End: 2021-09-02
Payer: COMMERCIAL

## 2021-09-02 VITALS
RESPIRATION RATE: 14 BRPM | HEART RATE: 73 BPM | WEIGHT: 236.8 LBS | BODY MASS INDEX: 37.17 KG/M2 | OXYGEN SATURATION: 98 % | DIASTOLIC BLOOD PRESSURE: 80 MMHG | HEIGHT: 67 IN | TEMPERATURE: 98 F | SYSTOLIC BLOOD PRESSURE: 122 MMHG

## 2021-09-02 DIAGNOSIS — Z00.00 ROUTINE PHYSICAL EXAMINATION: Primary | ICD-10-CM

## 2021-09-02 DIAGNOSIS — F32.0 CURRENT MILD EPISODE OF MAJOR DEPRESSIVE DISORDER, UNSPECIFIED WHETHER RECURRENT (CMS/HCC): ICD-10-CM

## 2021-09-02 DIAGNOSIS — R00.2 PALPITATIONS: ICD-10-CM

## 2021-09-02 DIAGNOSIS — Z23 ENCOUNTER FOR IMMUNIZATION: ICD-10-CM

## 2021-09-02 DIAGNOSIS — M21.6X1 PRONATION OF BOTH FEET: ICD-10-CM

## 2021-09-02 DIAGNOSIS — M21.6X2 PRONATION OF BOTH FEET: ICD-10-CM

## 2021-09-02 DIAGNOSIS — G44.209 TENSION-TYPE HEADACHE, NOT INTRACTABLE, UNSPECIFIED CHRONICITY PATTERN: ICD-10-CM

## 2021-09-02 PROCEDURE — 90471 IMMUNIZATION ADMIN: CPT | Performed by: NURSE PRACTITIONER

## 2021-09-02 PROCEDURE — 93000 ELECTROCARDIOGRAM COMPLETE: CPT | Performed by: NURSE PRACTITIONER

## 2021-09-02 PROCEDURE — 3008F BODY MASS INDEX DOCD: CPT | Performed by: NURSE PRACTITIONER

## 2021-09-02 PROCEDURE — 99396 PREV VISIT EST AGE 40-64: CPT | Mod: 25 | Performed by: NURSE PRACTITIONER

## 2021-09-02 PROCEDURE — 90715 TDAP VACCINE 7 YRS/> IM: CPT | Performed by: NURSE PRACTITIONER

## 2021-09-02 ASSESSMENT — ENCOUNTER SYMPTOMS
MYALGIAS: 0
EYES NEGATIVE: 1
WOUND: 0
CHILLS: 0
HEMATOLOGIC/LYMPHATIC NEGATIVE: 1
HEADACHES: 1
ABDOMINAL PAIN: 0
CONFUSION: 0
ALLERGIC/IMMUNOLOGIC NEGATIVE: 1
DIARRHEA: 0
FEVER: 0
SINUS PRESSURE: 0
ENDOCRINE NEGATIVE: 1
PALPITATIONS: 0
SINUS PAIN: 0
DIAPHORESIS: 0
SHORTNESS OF BREATH: 0
FATIGUE: 0
NAUSEA: 0
SORE THROAT: 0
CHEST TIGHTNESS: 0
COUGH: 0
AGITATION: 0

## 2021-09-02 ASSESSMENT — PATIENT HEALTH QUESTIONNAIRE - PHQ9: SUM OF ALL RESPONSES TO PHQ9 QUESTIONS 1 & 2: 0

## 2021-09-02 NOTE — PATIENT INSTRUCTIONS
"Please schedule an appointment with [facility] ASAP as discussed - Dr. Valerie Garcia or Dr. Mana Dumont or Dr Roseann Stallworth or - [phone].  Increase vitamin D3 to 5000 units per day.  Increase fish oil to 2000g per day (1000g - 2x per day)    Where your supportive shoes as often as you can to help with foot and ankle pain. You were given a Tetanus vaccine today which is protective for 10 years.     Thank you for choosing [facility].    Patient Education   https://www.cdc.gov/vaccines/hcp/vis/vis-statements/tdap.pdf\">   Tdap (Tetanus, Diphtheria, Pertussis) Vaccine: What You Need to Know  1. Why get vaccinated?  Tdap vaccine can prevent tetanus, diphtheria, and pertussis.  Diphtheria and pertussis spread from person to person. Tetanus enters the body through cuts or wounds.  · TETANUS (T) causes painful stiffening of the muscles. Tetanus can lead to serious health problems, including being unable to open the mouth, having trouble swallowing and breathing, or death.  · DIPHTHERIA (D) can lead to difficulty breathing, heart failure, paralysis, or death.  · PERTUSSIS (aP), also known as \"whooping cough,\" can cause uncontrollable, violent coughing which makes it hard to breathe, eat, or drink. Pertussis can be extremely serious in babies and young children, causing pneumonia, convulsions, brain damage, or death. In teens and adults, it can cause weight loss, loss of bladder control, passing out, and rib fractures from severe coughing.  2. Tdap vaccine  Tdap is only for children 7 years and older, adolescents, and adults.   Adolescents should receive a single dose of Tdap, preferably at age 11 or 12 years.  Pregnant women should get a dose of Tdap during every pregnancy, to protect the  from pertussis. Infants are most at risk for severe, life-threatening complications from pertussis.  Adults who have never received Tdap should get a dose of Tdap.  Also, adults should " receive a booster dose every 10 years, or earlier in the case of a severe and dirty wound or burn. Booster doses can be either Tdap or Td (a different vaccine that protects against tetanus and diphtheria but not pertussis).  Tdap may be given at the same time as other vaccines.  3. Talk with your health care provider  Tell your vaccine provider if the person getting the vaccine:  · Has had an allergic reaction after a previous dose of any vaccine that protects against tetanus, diphtheria, or pertussis, or has any severe, life-threatening allergies.  · Has had a coma, decreased level of consciousness, or prolonged seizures within 7 days after a previous dose of any pertussis vaccine (DTP, DTaP, or Tdap).  · Has seizures or another nervous system problem.  · Has ever had Guillain-Barré Syndrome (also called GBS).  · Has had severe pain or swelling after a previous dose of any vaccine that protects against tetanus or diphtheria.  In some cases, your health care provider may decide to postpone Tdap vaccination to a future visit.   People with minor illnesses, such as a cold, may be vaccinated. People who are moderately or severely ill should usually wait until they recover before getting Tdap vaccine.   Your health care provider can give you more information.  4. Risks of a vaccine reaction  · Pain, redness, or swelling where the shot was given, mild fever, headache, feeling tired, and nausea, vomiting, diarrhea, or stomachache sometimes happen after Tdap vaccine.  People sometimes faint after medical procedures, including vaccination. Tell your provider if you feel dizzy or have vision changes or ringing in the ears.   As with any medicine, there is a very remote chance of a vaccine causing a severe allergic reaction, other serious injury, or death.  5. What if there is a serious problem?  An allergic reaction could occur after the vaccinated person leaves the clinic. If you see signs of a severe allergic reaction  (hives, swelling of the face and throat, difficulty breathing, a fast heartbeat, dizziness, or weakness), call 9-1-1 and get the person to the nearest hospital.  For other signs that concern you, call your health care provider.   Adverse reactions should be reported to the Vaccine Adverse Event Reporting System (VAERS). Your health care provider will usually file this report, or you can do it yourself. Visit the VAERS website at www.vaers.hhs.gov or call 1-199.731.5437. VAERS is only for reporting reactions, and VAERS staff do not give medical advice.  6. The National Vaccine Injury Compensation Program  The National Vaccine Injury Compensation Program (VICP) is a federal program that was created to compensate people who may have been injured by certain vaccines. Visit the VICP website at www.[facility].gov/vaccinecompensation or call 1-750.362.7493 to learn about the program and about filing a claim. There is a time limit to file a claim for compensation.  7. How can I learn more?  · Ask your health care provider.  · Call your local or state health department.  · Contact the Centers for Disease Control and Prevention (CDC):  ? Call 1-319.447.8821 (3-510-LKN-INFO) or  ? Visit CDC's website at www.cdc.gov/vaccines  Vaccine Information Statement Tdap (Tetanus, Diphtheria, Pertussis) Vaccine (04/01/2020)  This information is not intended to replace advice given to you by your health care provider. Make sure you discuss any questions you have with your health care provider.  Document Released: 06/18/2013 Document Revised: 04/10/2020 Document Reviewed: 04/13/2020  Elsevier Patient Education © 2020 [employer]

## 2021-09-02 NOTE — PROGRESS NOTES
Subjective      Patient ID: Barbra Norwood is a 46 y.o. female.  1975      Pt here for routine physical. 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 Lives w/  and 2 sons. Pt admits to exercise twice weekly consisting of one hour of pilates. Pt reports diet high in salty snacks. Pt reports one alcoholic drink every 2 weeks and denies tobacco use. Pt states she has mammogram scheduled for 10/1 at [facility] in [facility]. Pt admits to episode of palpitations on 8/13 lasting 5 mins which pt attributes to heat and exertion moving son into college. Pt states she has hx of palpitations in 2019 which resulted in negative stress test and Echo. Pt c/o lateral ankle and foot pain. Pt admits to recently going to be fitted for special sneakers but reports she rarely wears her sneakers. Pt states depression is well controlled with Celexa. Pt agreeable to have tetanus vaccine today.      The following have been reviewed and updated as appropriate in this visit:  Allergies  Meds  Problems       Review of Systems   Constitutional: Negative for chills, diaphoresis, fatigue and fever.   HENT: Negative for congestion, ear pain, sinus pressure, sinus pain, sneezing and sore throat.    Eyes: Negative.    Respiratory: Negative for cough, chest tightness and shortness of breath.    Cardiovascular: Negative for chest pain and palpitations.   Gastrointestinal: Negative for abdominal pain, diarrhea and nausea.   Endocrine: Negative.    Genitourinary: Negative.    Musculoskeletal: Negative for myalgias.        Ankle and foot pain   Skin: Negative for rash and wound.   Allergic/Immunologic: Negative.    Neurological: Positive for headaches.   Hematological: Negative.    Psychiatric/Behavioral: Negative for agitation, behavioral problems, confusion, self-injury and suicidal ideas.       Objective     Vitals:    09/02/21 1232   BP: 122/80   BP Location: Left upper arm   Patient Position: Sitting   Pulse: 73  "  Resp: 14   Temp: 36.7 °C (98 °F)   TempSrc: Oral   SpO2: 98%   Weight: 107 kg (236 lb 12.8 oz)   Height: 1.702 m (5' 7\")     Body mass index is 37.09 kg/m².    Physical Exam  Vitals reviewed.   Constitutional:       General: She is not in acute distress.     Appearance: Normal appearance. She is not ill-appearing or toxic-appearing.   HENT:      Head: Normocephalic.      Jaw: There is normal jaw occlusion.      Right Ear: Hearing, tympanic membrane, ear canal and external ear normal.      Left Ear: Hearing, tympanic membrane, ear canal and external ear normal.      Nose: Nose normal.      Mouth/Throat:      Lips: Pink.      Mouth: Mucous membranes are moist.      Pharynx: Oropharynx is clear. Uvula midline. No posterior oropharyngeal erythema or uvula swelling.   Eyes:      General: Lids are normal.      Extraocular Movements: Extraocular movements intact.      Conjunctiva/sclera: Conjunctivae normal.      Pupils: Pupils are equal, round, and reactive to light.   Neck:      Trachea: Trachea normal.   Cardiovascular:      Rate and Rhythm: Normal rate and regular rhythm.      Pulses: Normal pulses.      Heart sounds: S1 normal and S2 normal.   Pulmonary:      Effort: Pulmonary effort is normal.      Breath sounds: Normal breath sounds.   Abdominal:      General: Bowel sounds are normal.      Palpations: Abdomen is soft.      Tenderness: There is no abdominal tenderness.   Musculoskeletal:      Cervical back: Normal range of motion.      Right lower leg: No edema.      Left lower leg: No edema.      Comments: Pronation bilat feet   Lymphadenopathy:      Cervical: No cervical adenopathy.   Skin:     General: Skin is warm and dry.   Neurological:      Mental Status: She is alert and oriented to person, place, and time.      GCS: GCS eye subscore is 4. GCS verbal subscore is 5. GCS motor subscore is 6.      Cranial Nerves: Cranial nerves are intact.      Sensory: Sensation is intact.      Motor: Motor function is " intact.      Coordination: Coordination is intact.      Gait: Gait is intact.      Deep Tendon Reflexes: Reflexes are normal and symmetric.   Psychiatric:         Attention and Perception: Attention and perception normal.         Mood and Affect: Mood and affect normal.         Speech: Speech normal.         Behavior: Behavior normal. Behavior is cooperative.         Thought Content: Thought content normal.         Cognition and Memory: Cognition and memory normal.         Judgment: Judgment normal.         Assessment/Plan   Diagnoses and all orders for this visit:    Routine physical examination (Primary)    BMI 37.0-37.9, adult    Current mild episode of major depressive disorder, unspecified whether recurrent (CMS/HCC)    Palpitations  -     ECG 12 LEAD OFFICE PERFORMED    Encounter for immunization  -     Tdap vaccine greater than or equal to 6yo IM    Pronation of both feet

## 2021-11-09 DIAGNOSIS — F33.42 RECURRENT MAJOR DEPRESSIVE DISORDER, IN FULL REMISSION (CMS/HCC): ICD-10-CM

## 2021-11-10 RX ORDER — CITALOPRAM 40 MG/1
TABLET, FILM COATED ORAL
Qty: 90 TABLET | Refills: 1 | Status: SHIPPED | OUTPATIENT
Start: 2021-11-10 | End: 2022-05-09

## 2021-11-23 ENCOUNTER — TELEPHONE (OUTPATIENT)
Dept: PRIMARY CARE | Facility: CLINIC | Age: 46
End: 2021-11-23

## 2021-11-23 NOTE — TELEPHONE ENCOUNTER
Patient requesting telemed appt to discuss son's mental health and returning to work in the office.

## 2021-11-23 NOTE — TELEPHONE ENCOUNTER
Please offer her 11/30 @12:30 with Dr. Encarnacion. I send her a portal message yesterday about this appointment time

## 2021-11-30 ENCOUNTER — TELEMEDICINE (OUTPATIENT)
Dept: PRIMARY CARE | Facility: CLINIC | Age: 46
End: 2021-11-30
Payer: COMMERCIAL

## 2021-11-30 DIAGNOSIS — F32.5 MAJOR DEPRESSIVE DISORDER IN FULL REMISSION, UNSPECIFIED WHETHER RECURRENT (CMS/HCC): Primary | ICD-10-CM

## 2021-11-30 PROBLEM — G44.209 TENSION-TYPE HEADACHE, NOT INTRACTABLE: Status: RESOLVED | Noted: 2021-09-02 | Resolved: 2021-11-30

## 2021-11-30 PROBLEM — M21.6X1 PRONATION OF BOTH FEET: Status: RESOLVED | Noted: 2021-09-02 | Resolved: 2021-11-30

## 2021-11-30 PROBLEM — M21.6X2 PRONATION OF BOTH FEET: Status: RESOLVED | Noted: 2021-09-02 | Resolved: 2021-11-30

## 2021-11-30 PROCEDURE — 99213 OFFICE O/P EST LOW 20 MIN: CPT | Mod: 95 | Performed by: FAMILY MEDICINE

## 2022-02-25 ENCOUNTER — APPOINTMENT (RX ONLY)
Dept: URBAN - METROPOLITAN AREA CLINIC 23 | Facility: CLINIC | Age: 47
Setting detail: DERMATOLOGY
End: 2022-02-25

## 2022-02-25 DIAGNOSIS — D22 MELANOCYTIC NEVI: ICD-10-CM

## 2022-02-25 DIAGNOSIS — L82.1 OTHER SEBORRHEIC KERATOSIS: ICD-10-CM

## 2022-02-25 DIAGNOSIS — L81.4 OTHER MELANIN HYPERPIGMENTATION: ICD-10-CM

## 2022-02-25 DIAGNOSIS — D18.0 HEMANGIOMA: ICD-10-CM

## 2022-02-25 DIAGNOSIS — L72.8 OTHER FOLLICULAR CYSTS OF THE SKIN AND SUBCUTANEOUS TISSUE: ICD-10-CM

## 2022-02-25 PROBLEM — D18.01 HEMANGIOMA OF SKIN AND SUBCUTANEOUS TISSUE: Status: ACTIVE | Noted: 2022-02-25

## 2022-02-25 PROBLEM — D22.5 MELANOCYTIC NEVI OF TRUNK: Status: ACTIVE | Noted: 2022-02-25

## 2022-02-25 PROCEDURE — ? TREATMENT REGIMEN

## 2022-02-25 PROCEDURE — ? SUNSCREEN RECOMMENDATIONS

## 2022-02-25 PROCEDURE — 99213 OFFICE O/P EST LOW 20 MIN: CPT

## 2022-02-25 PROCEDURE — ? ADDITIONAL NOTES

## 2022-02-25 PROCEDURE — ? COUNSELING

## 2022-02-25 ASSESSMENT — LOCATION ZONE DERM
LOCATION ZONE: TRUNK
LOCATION ZONE: FACE

## 2022-02-25 ASSESSMENT — LOCATION DETAILED DESCRIPTION DERM
LOCATION DETAILED: RIGHT LATERAL MALAR CHEEK
LOCATION DETAILED: LEFT MEDIAL SUPERIOR CHEST
LOCATION DETAILED: RIGHT MEDIAL UPPER BACK
LOCATION DETAILED: EPIGASTRIC SKIN
LOCATION DETAILED: RIGHT RIB CAGE

## 2022-02-25 ASSESSMENT — LOCATION SIMPLE DESCRIPTION DERM
LOCATION SIMPLE: RIGHT UPPER BACK
LOCATION SIMPLE: ABDOMEN
LOCATION SIMPLE: RIGHT CHEEK
LOCATION SIMPLE: CHEST

## 2022-03-17 NOTE — PROGRESS NOTES
Lifetime Weight History and Trends:   Previous weight loss strategies:  Opti Fast  Previous weight loss medications:  Blank  Previous surgical interventions: Blank     Program Interests:  Nutrition plan: Yes  Medications: Yes  Surgery: None  Meal replacements: Yes  Supplements: Yes    Goal/Healthy weight: 190 lbs ( 20 yrs ago )  Onset of weight difficulty: 9 yo  Age and weight at first weight loss attempt: 137 lbs at 15 yo  Weight trend in the past year: Increasing  Lowest adult weight: 165 lbs at 23 yo  Highest adult weight: 243 lbs at 46 yo  Childhood weight category: 137 lbs at 16 yo  Most significant weight loss: 30 lbs in 2019 ( Optiva ) Stopped - to repetative  Number of yo-yo dieting episodes: 3 - 5 Times  Life events related to weight gained: Pregnancy    Daily Routine and Food/Beverage Patterns:   Eating interval: 7 am - 7 pm  Meal structure (planned meals/snack per day): 3 Meals, 3 Snacks ( Chips/Candy  After dinner eatin   Night eating:None  Frequent snacking (>2): None  Number of meals per week not prepared at home: 4     Food tracking method: None  Current eating plan: None  Food Allergies/Intolerances: Blank  Person who does grocery shopping:  Person who does meal preparation:    24 hour recall:  Breakfast: Fruit Smoothie  Lunch: Taco Salad  Dinner: Chicken Caesar Salad  Snacks: Peanut butter Cookie    Daily water intake: 240oz  Daily coffee or tea intake: 16 oz Sweetened Coffee  Sweetened beverages: Blank  Diet or artificial sweetened beverages: 12 oz Diet Drinks, 14 oz Protein Drinks/ Shakes  Alcohol: Blank    Number of vegetable servings per day: 1 Serving  Number of fruit servings per day:           1 Serving  Number of protein servings per day:      3 Proteins  Number of starches/grains per day:       2 Servings  Number of processed foods per day:     4 Servings     Non Hunger and Disordered Eating:  Emotional (bored, irritable, lonely, tired, stressed, sad, celebrating): Bored,  Stressed, Celebrating  Mindless (stay awake, procrastinate, , fast eating): Procrastinate, Fast Eater   Availability (social parties, free, offered): Socialize Parties, Available, [employer]ient  Overeating (large portions):  Plate/ Container is empty  Distracted (driving, working, watching TV, using phone, computer or tablet): Working, Watching TV  Hunger Patterns (always, never, ravenous, skips meals, forget to eat): I always feel hungry  Intense food cravings: Salty  Often food cravings: often  Food Addiction: Yes, Salty Foods  Binge Eating Screen: Weekly    Comments:Blank    Stressors:  Current level of life stress: Medium  Significant stressors: Family, Work  Rejuvenating activities: Reading, Watching TV  Rejuvenating frequency activities: Daily  Relaxation techniques: Read, Watch TV, Play games on smartphone    Physical Activity:  Activity tracker: Yes, Apple Watch  Number of steps per day: Blank  Activity level at work: Sedentary   Activity level at home: Sedentary  Hours of work screen: 7 hrs   Hours of leisure screen: 4 hrs   Current exercise: None  How often and type of exercise: Blank  Barriers to physical activity: Joint Pain, Ankle Pain    Sleep:  Average hours of sleep per night: 7 hrs  Sleep is not restful  Trouble falling asleep: Blank  Trouble staying asleep: Yes  Snoring or witnessed apneas: Snoring     Questions YES NO   1 During the last 3 months, did you have any episodes of excessive overeating (i.e. eating significantly more than what most people would eat in a similar period)?   Yes    NOTE: IF YOU ANSWERED “NO” TO QUESTION 1, YOU MAY STOP. THE REMAINING QUESTIONS DO NOT APPLY TO YOU      2 Do you feel distresses about your episodes of excessive overeating?         Within the past 3 months… Never or Rarely Sometimes Often Always   3 During your episodes of excessive overwaiting, how often did you feel like you had no control over your eating (e.g., not being able to stop eating,  feel compelled to eat, or going back and forth for more food)?       4 During your episodes of excessive overeating, how often did you continue eating even though you were not hungry?       5 During your episodes of excessive overeating, how often were you embarrassed by how much you ate?       6 During your episodes of excessive overeating, how often did you feel disgusted with yourself or guilty afterwards?       7 During the last 3 months, how often did you make yourself vomit as a means to control your weight or shape?

## 2022-03-31 ENCOUNTER — OFFICE VISIT (OUTPATIENT)
Dept: BARIATRICS/WEIGHT MGMT | Facility: CLINIC | Age: 47
End: 2022-03-31
Payer: COMMERCIAL

## 2022-03-31 VITALS
OXYGEN SATURATION: 97 % | WEIGHT: 242.4 LBS | DIASTOLIC BLOOD PRESSURE: 80 MMHG | HEART RATE: 77 BPM | TEMPERATURE: 98.2 F | BODY MASS INDEX: 38.04 KG/M2 | RESPIRATION RATE: 12 BRPM | HEIGHT: 67 IN | SYSTOLIC BLOOD PRESSURE: 120 MMHG

## 2022-03-31 DIAGNOSIS — R06.83 SNORING: Primary | ICD-10-CM

## 2022-03-31 DIAGNOSIS — E55.9 VITAMIN D DEFICIENCY: ICD-10-CM

## 2022-03-31 DIAGNOSIS — F32.5 MAJOR DEPRESSIVE DISORDER IN FULL REMISSION, UNSPECIFIED WHETHER RECURRENT (CMS/HCC): ICD-10-CM

## 2022-03-31 DIAGNOSIS — E78.5 HYPERLIPIDEMIA, UNSPECIFIED HYPERLIPIDEMIA TYPE: ICD-10-CM

## 2022-03-31 PROBLEM — E78.49 OTHER HYPERLIPIDEMIA: Status: ACTIVE | Noted: 2022-03-31

## 2022-03-31 PROCEDURE — 3008F BODY MASS INDEX DOCD: CPT | Performed by: FAMILY MEDICINE

## 2022-03-31 PROCEDURE — 99205 OFFICE O/P NEW HI 60 MIN: CPT | Performed by: FAMILY MEDICINE

## 2022-03-31 ASSESSMENT — ENCOUNTER SYMPTOMS
MYALGIAS: 1
ACTIVITY CHANGE: 1
ARTHRALGIAS: 1
NERVOUS/ANXIOUS: 1
APPETITE CHANGE: 1
HEADACHES: 1
SLEEP DISTURBANCE: 1
BACK PAIN: 1

## 2022-03-31 NOTE — Clinical Note
Katie Encarnacion, Hope you are having a good week! I had the pleasure of meeting with Ms. Norwood today in my [facility] clinic for an initial consultation. She is very motivated to get to a better health. We decided to restart healthy lifestyle modification. She is going to establish with the team here at [facility] including the dietitian and exercise physiologist to keep her accountable. I appreciate you letting us be a part of her weight loss journey. Please let me know if you have any questions or concerns. Best DT 
Alert and oriented to person, place, time/situation. normal mood and affect. no apparent risk to self or others.

## 2022-03-31 NOTE — ASSESSMENT & PLAN NOTE
"Having good control over mood and managing stress is an important part of successful weight management.    Please continue to follow the  plan as outlined by your treating provider.      Consider using a relaxation technique daily to improve resiliency and mood.     Relaxation techniques are important in order to let your body recover from living in the fight and flight branch of our body's nervous system.  \"Relaxing\" is not the same as practicing a relaxation technique.  Current science on relaxation techniques suggests that practicing a relaxation 10 minutes a day can help to repair the body's overtaxed stress system.      Controlling something that is normally automatic forces us out of our sympathetic (fight and flight) nervous system and into our parasympathetic (rest and recover) nervous system. The body cannot be in both sections of the nervous system at the same time!  Relaxation techniques require controlling something that is normally automatic such as breathing, heart rate, posture, and thoughts.  Common relaxation techniques include : various meditations (mindful, transcendental, guided, concentrated body scan, loving kindness), deep breathing, yoga, Ford Chi,  progressive muscle relaxation, heart math, and binaural beats.     See plan for obesity  "

## 2022-03-31 NOTE — ASSESSMENT & PLAN NOTE
There is a strong connection between proper sleep and the ability to regulate weight.  There are multiple techniques for improving sleep.  The #1 sleep aid available is routine dedicated physical activity.  Make sure to avoid caffeinated beverages after lunch time.  Manipulating light and melatonin and keeping a consistent wake time can be helpful.  Consider turning off overhead lights and using  table and floor lamps for lighting within 2 hours of scheduled bedtime.  Turn off bluelight electronics within 1 hour of scheduled bedtime.  Consider relaxation technique such as journaling, meditation, binaural beats, or gentle stretching prior to sleep.  Try to wake up around the same time every day regardless of the previous night sleep.  If needed, over-the-counter supplements such as low-dose melatonin 0.5 -3 mg or valerian root may be helpful.  Prescription medications could also be considered if needed.   See plan for obesity

## 2022-03-31 NOTE — PATIENT INSTRUCTIONS
Great to see you today, stay well, stay safe, stay active.    Recommend set up team at [facility]-   1.  Registered Dietician (Jaxon Peterson or Katie Nye)   2.  Exercise Physiologist (Shannan SRINIVASAN).6mwt  3.  CBT With therapy : [facility]-referral done  [facility]- [phone]    Nutrition:  Start to keep food journal-Handouts shared -low glycemic/ meal plan  3 balanced meals at set times, 1-2 healthy snacks.  Add protein to each meal and snacks.  No skipping meals, no eating in between meals,distract self.  2 cups of green veggies for lunch and dinner.  Increase fluids to 64 oz of fluids /d.               Exercise:  Add stretches or exercise -walk 10-30 min x 3 /week.  Stress relieving activities 5-10 minutes of yoga or deep breathing, stretching daily.    Mindfulness and sleep:  Good sleep hygiene. Melatonin , lavender oil.  Sleep study - holding off on referral .    JUNIOR options:  1.  Hangtime mla-[id number] code  2.  SOMRYST junior    Medications:  Medications discussed-holding off  on adding weight loss meds for now  Vit d 2000 international units per day  Vitamin B12 daily  Probiotic daily  Follow-up in 3 months.

## 2022-03-31 NOTE — ASSESSMENT & PLAN NOTE
Vitamin D deficiency and insufficiency can worsen insulin resistance and promote weight positivity.    We discussed the importance of maintaining adequate vitamin D levels for optimal metabolism and help with insulin resistance.  Continue vit d supplementation.  Vitamin D is best absorbed when taken with food/a meal that contains fat.  no Hx of kidney stones.  See plan for obesity

## 2022-03-31 NOTE — PROGRESS NOTES
DETAILS OF CONSULTATION     Consulting Service:  [facility]    Referring Physician: Mignon Encarnacion DO    Reason for Consultation:   Chief Complaint   Patient presents with   • Weight Management        HISTORY OF PRESENT ILLNESS        Barbra Norwood is a 47 y.o. female  here to discuss how weight loss through optimized nutrition, physical activity, and behavior modification can improve weight related conditions.      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     Lifetime weight trends:   was over weight as a child.  She started having trouble managing her weight at age 9.  Her first weight loss attempt was at age 16.  Her lowest adult weight was 165 pounds at 24 years of age.  Highest adult weight of 243 pounds at age 47.  Her best weight loss was 30 pounds with Optiva in 2019.  Over the last year her weight has been  increasing.    Insights on personal weight regulation risks: Pregnancy, stress, planning and prepping salty food cravings in the afternoon.  Limited exercise due to ankle pain    Daily routine:   Wake up 6:30 AM  Son to school  Dogs out    Bf-8 AM coffee with 2 teaspoons of Splenda and almond milk  Or recently protein shake/fruit smoothie.  Lunch-11 AM to 1 PM-is the hardest to plan between meetings  3 PM-handful of pretzels-does feel very hungry at this time but avoids to eat.  Dinner-6-8 PM PM tries to get protein and salad, but does eat out most nights.      Current status of the weight related conditions, relevant history, and new concerns:  Snoring  Depression hyperlipidemia  Arthritis, back pain, joint pain, ankle pain-rheumatology work-up negative-both hands has noticed deformity.  Migraine headaches  Vitamin D deficiency  S/p cyst removal     I have reviewed the patient's past medical and surgical history, family history, psychiatric, and social history.     Potentially weight positive  medications: SSRI    Pertinent negative history:   Patient denies history of anorexia, bulimia, addiction, ADHD, seizures, pancreatitis, gallstones, Coronary Artery Disease, valvular disease, cardiomyopathy, arrhythmias, gout, chronic renal disease, nephrolithiasis, disorders of electrolyte imbalance, thyroid disorders.      Pertinent positive history not already mentioned:     Poor Nutrition, prolonged interval, inadequate physical activity, emotional eating, Binge eating disorder, high stress    All histories reviewed prior to patient visit and updated as needed in the chart after discussing with patient.  Reviewed patient new patient intake paper work as documented below by MA below.     INFORMATION FROM INTAKE FORMS:    WEIGHT HISTORY:  Lifetime Weight History and Trends:   Previous weight loss strategies:  Opti Fast  Previous weight loss medications:  Blank  Previous surgical interventions: Blank      Program Interests:  Nutrition plan: Yes  Medications: Yes  Surgery: None  Meal replacements: Yes  Supplements: Yes     Goal/Healthy weight: 190 lbs ( 20 yrs ago )  Onset of weight difficulty: 9 yo  Age and weight at first weight loss attempt: 137 lbs at 15 yo  Weight trend in the past year: Increasing  Lowest adult weight: 165 lbs at 23 yo  Highest adult weight: 243 lbs at 46 yo  Childhood weight category: 137 lbs at 18 yo  Most significant weight loss: 30 lbs in 2019 ( Optiva ) Stopped - to repetative  Number of yo-yo dieting episodes: 3 - 5 Times  Life events related to weight gained: Pregnancy     Daily Routine and Food/Beverage Patterns:   Eating interval: 7 am - 7 pm  Meal structure (planned meals/snack per day): 3 Meals, 3 Snacks ( Chips/Candy  After dinner eatin   Night eating:None  Frequent snacking (>2): None  Number of meals per week not prepared at home: 4      Food tracking method: None  Current eating plan: None  Food Allergies/Intolerances: Blank  Person who does grocery shopping:  Person  who does meal preparation:     24 hour recall:  Breakfast: Fruit Smoothie  Lunch: Taco Salad  Dinner: Chicken Caesar Salad  Snacks: Peanut butter Cookie     Daily water intake: 240oz  Daily coffee or tea intake: 16 oz Sweetened Coffee  Sweetened beverages: Blank  Diet or artificial sweetened beverages: 12 oz Diet Drinks, 14 oz Protein Drinks/ Shakes  Alcohol: Blank     Number of vegetable servings per day: 1 Serving  Number of fruit servings per day:           1 Serving  Number of protein servings per day:      3 Proteins  Number of starches/grains per day:       2 Servings  Number of processed foods per day:     4 Servings      Non Hunger and Disordered Eating:  Emotional (bored, irritable, lonely, tired, stressed, sad, celebrating): Bored, Stressed, Celebrating  Mindless (stay awake, procrastinate, , fast eating): Procrastinate, Fast Eater   Availability (social parties, free, offered): Socialize Parties, Available, Conventient  Overeating (large portions):  Plate/ Container is empty  Distracted (driving, working, watching TV, using phone, computer or tablet): Working, Watching TV  Hunger Patterns (always, never, ravenous, skips meals, forget to eat): I always feel hungry  Intense food cravings: Salty  Often food cravings: often  Food Addiction: Yes, Salty Foods  Binge Eating Screen: Weekly     Comments:Blank     Stressors:  Current level of life stress: Medium  Significant stressors: Family, Work  Rejuvenating activities: Reading, Watching TV  Rejuvenating frequency activities: Daily  Relaxation techniques: Read, Watch TV, Play games on smartphone     Physical Activity:  Activity tracker: Yes, Apple Watch  Number of steps per day: Blank  Activity level at work: Sedentary   Activity level at home: Sedentary  Hours of work screen: 7 hrs   Hours of leisure screen: 4 hrs   Current exercise: None  How often and type of exercise: Blank  Barriers to physical activity: Joint Pain, Ankle  Pain     Sleep:  Average hours of sleep per night: 7 hrs  Sleep is not restful  Trouble falling asleep: Blank  Trouble staying asleep: Yes  Snoring or witnessed apneas: Snoring    PAST MEDICAL AND SURGICAL HISTORY        Past Medical History:   Diagnosis Date   • Arthritis    • Depression        Past Surgical History:   Procedure Laterality Date   •  SECTION      2x   • CYST REMOVAL  2019   • TONSILLECTOMY         PCP: Mignon Encarnacion, DO    MEDICATIONS          Current Outpatient Medications:   •  ascorbic acid (VITAMIN C) 500 mg tablet, Take 500 mg by mouth daily., Disp: , Rfl:   •  biotin 300 mcg tablet, Take by mouth daily., Disp: , Rfl:   •  cholecalciferol, vitamin D3, 1,000 unit (25 mcg) tablet, Take 1,000 Units by mouth daily., Disp: , Rfl:   •  citalopram 40 mg tablet, TAKE 1 TABLET BY MOUTH EVERY DAY, Disp: 90 tablet, Rfl: 1  •  desog-e.estradioL/e.estradioL (KARIVA) 0.15-0.02 mgx21 /0.01 mg x 5 per tablet, take 1 tablet by oral route  every day, Disp: , Rfl:   •  FISH OIL-omega-3 fatty acids (FISH OIL) 340-1,000 mg capsule, Take by mouth 3 (three) times a day., Disp: , Rfl:   •  GLUC-CHOND-MSM-D3-HYAL-GERARDO BOR ORAL, Take by mouth., Disp: , Rfl:     ALLERGIES        Bactrim [sulfamethoxazole-trimethoprim] and Penicillins    FAMILY HISTORY        Family History   Problem Relation Age of Onset   • Breast cancer Biological Mother    • Arthritis Biological Mother    • Arthritis Maternal Grandmother    • Arthritis Maternal Grandfather    • Breast cancer Mother's Sister    • Autism Biological Child    • Pulmonary embolism Biological Father        SOCIAL/ TOBACCO HISTORY        Social History     Tobacco Use   • Smoking status: Never Smoker   • Smokeless tobacco: Never Used   Substance Use Topics   • Alcohol use: Yes     Comment: rarely   • Drug use: No     Social History     Social History Narrative   • Not on file       REVIEW OF SYSTEMS      Review of Systems    Review of Systems   Constitutional:  "Positive for activity change and appetite change.   Respiratory:        Snoring   Endocrine: Positive for cold intolerance and heat intolerance.   Musculoskeletal: Positive for arthralgias, back pain and myalgias.   Allergic/Immunologic: Positive for environmental allergies.   Neurological: Positive for headaches.   Psychiatric/Behavioral: Positive for sleep disturbance. The patient is nervous/anxious.         PHYSICAL EXAMINATION      Visit Vitals  /80 (BP Location: Left upper arm, Patient Position: Sitting)   Pulse 77   Temp 36.8 °C (98.2 °F) (Oral)   Resp 12   Ht 1.702 m (5' 7\")   Wt 110 kg (242 lb 6.4 oz)   LMP 03/05/2022   SpO2 97%   BMI 37.97 kg/m²      Waist Measurement: 36 in  BMR Calculated: 1754  Body Fat: 42.97  Physical Exam  Vitals and nursing note reviewed.   Constitutional:       Appearance: She is well-developed. She is obese.   HENT:      Head: Atraumatic.      Right Ear: External ear normal.      Left Ear: External ear normal.      Nose: Nose normal.      Mouth/Throat:      Mouth: Mucous membranes are moist.      Comments: Mallampati score 2+  Eyes:      Conjunctiva/sclera: Conjunctivae normal.   Cardiovascular:      Rate and Rhythm: Normal rate.   Pulmonary:      Effort: Pulmonary effort is normal.   Abdominal:      General: Bowel sounds are normal.   Musculoskeletal:         General: Normal range of motion.      Cervical back: Normal range of motion.   Skin:     General: Skin is warm and dry.   Neurological:      General: No focal deficit present.      Mental Status: She is alert and oriented to person, place, and time.   Psychiatric:         Mood and Affect: Mood normal.         Behavior: Behavior normal.         Thought Content: Thought content normal.         Judgment: Judgment normal.           LABS / IMAGING / EKG        Labs  I have reviewed the patient's pertinent labs.  Lab Results   Component Value Date    HGBA1C 5.4 07/05/2019     Lab Results   Component Value Date    CHOL 218 (H) " 05/17/2021    CHOL 240 (H) 07/05/2019    CHOL 208 (H) 09/29/2016     Lab Results   Component Value Date    HDL 65 05/17/2021    HDL 56 07/05/2019    HDL 60 09/29/2016     Lab Results   Component Value Date    LDLCALC 123 (H) 05/17/2021    LDLCALC 148 (H) 07/05/2019    LDLCALC 111 (H) 09/29/2016     Lab Results   Component Value Date    TRIG 189 (H) 05/17/2021    TRIG 214 (H) 07/05/2019    TRIG 183 (H) 09/29/2016     No results found for: CHOLHDL  Lab Results   Component Value Date    TSH 2.89 05/17/2021     Lab Results   Component Value Date    WBC 7.6 05/17/2021    HGB 13.4 05/17/2021    HCT 40.9 05/17/2021    MCV 92.3 05/17/2021     05/17/2021     Lab Results   Component Value Date     05/17/2021    K 4.8 05/17/2021     05/17/2021    CO2 26 05/17/2021    BUN 17 05/17/2021    CREATININE 0.99 05/17/2021    GLUCOSE 86 05/17/2021    AST 14 05/17/2021    ALT 17 05/17/2021    PROTEIN 6.7 05/17/2021    ALBUMIN 3.8 05/17/2021    BILITOT 0.6 05/17/2021    EGFR 68 05/17/2021    EGFR 79 05/17/2021      ASSESSMENT AND PLAN   Other hyperlipidemia  Elevated cholesterol levels often come from a blend of genetic and environmental factors.  Maximizing nutrition and physical activity is an important adjunct to medical management to treat elevated cholesterol levels and reduce the risk of arthrosclerotic disease such as heart attacks, strokes, and peripheral arterial disease.  See plan for obesity    BMI 37.0-37.9, adult  Great to see you today, stay well, stay safe, stay active.    Recommend set up team at [facility]-   1.  Registered Dietician (Jaxon Peterson or Katie Nye)   2.  Exercise Physiologist (Shannan SRINIVASAN).6mwt  3.  CBT With therapy : [place] [facility]-referral done  [facility]- [phone]    Nutrition:  Start to keep food journal-Handouts shared -low glycemic/ meal plan  3 balanced meals at set times, 1-2 healthy snacks.  Add protein to each meal and snacks.  No skipping meals, no eating  "in between meals,distract self.  2 cups of green veggies for lunch and dinner.  Increase fluids to 64 oz of fluids /d.                 Exercise:  Add stretches or exercise -walk 10-30 min x 3 /week.  Stress relieving activities 5-10 minutes of yoga or deep breathing, stretching daily.    Mindfulness and sleep:  Good sleep hygiene. Melatonin , lavender oil.  Sleep study - holding off on referral .    JUNIOR options:  1.  Headspace [id number] code  2.  SOMRYST junior  3.  EatRightNow junior by rina and Dr. Milady Smart  Discount code [id number].    Medications:  Medications discussed-holding off  on adding weight loss meds for now  Vit d 2000 international units per day  Vitamin B12 daily  Probiotic daily  Follow-up in 3 months.    Depression  Having good control over mood and managing stress is an important part of successful weight management.    Please continue to follow the  plan as outlined by your treating provider.      Consider using a relaxation technique daily to improve resiliency and mood.     Relaxation techniques are important in order to let your body recover from living in the fight and flight branch of our body's nervous system.  \"Relaxing\" is not the same as practicing a relaxation technique.  Current science on relaxation techniques suggests that practicing a relaxation 10 minutes a day can help to repair the body's overtaxed stress system.      Controlling something that is normally automatic forces us out of our sympathetic (fight and flight) nervous system and into our parasympathetic (rest and recover) nervous system. The body cannot be in both sections of the nervous system at the same time!  Relaxation techniques require controlling something that is normally automatic such as breathing, heart rate, posture, and thoughts.  Common relaxation techniques include : various meditations (mindful, transcendental, guided, concentrated body scan, loving kindness), deep breathing, yoga, Ford Chi,  " "progressive muscle relaxation, heart math, and binaural beats.     See plan for obesity    Vitamin D deficiency  Vitamin D deficiency and insufficiency can worsen insulin resistance and promote weight positivity.    We discussed the importance of maintaining adequate vitamin D levels for optimal metabolism and help with insulin resistance.  Continue vit d supplementation.  Vitamin D is best absorbed when taken with food/a meal that contains fat.  no Hx of kidney stones.  See plan for obesity    Snoring  There is a strong connection between proper sleep and the ability to regulate weight.  There are multiple techniques for improving sleep.  The #1 sleep aid available is routine dedicated physical activity.  Make sure to avoid caffeinated beverages after lunch time.  Manipulating light and melatonin and keeping a consistent wake time can be helpful.  Consider turning off overhead lights and using  table and floor lamps for lighting within 2 hours of scheduled bedtime.  Turn off bluelight electronics within 1 hour of scheduled bedtime.  Consider relaxation technique such as journaling, meditation, binaural beats, or gentle stretching prior to sleep.  Try to wake up around the same time every day regardless of the previous night sleep.  If needed, over-the-counter supplements such as low-dose melatonin 0.5 -3 mg or valerian root may be helpful.  Prescription medications could also be considered if needed.   See plan for obesity    Weight loss through a multifaceted approach addressing metabolic factors, nutrition, physical activity, and mental well being will help this patient improve or resolve the following conditions related to mechanical forces of weight: depression as well as the following conditions related to metabolic and inflammatory processes of excess and dysfunctional adipose tissue or \"adiposopathy\"     After today's visit, we determined that contributing factors to the above weight related conditions " "include:   Insulin Resistance, Depression, Nutrition (ultraprocessed foods, inadequate protein),  Physical inactivity (low NEAT and low exercise), High stress (depression, weight),  No dedicated relaxation, prolonged eating interval, Mindless eating (finisher, unaware, social, procrastinating, fast), emotional (bored, lonely, stress, celebration), fractured sleep      We outlined nutrition targets and set goals for food type, timing and quantity.      Food type: Mostly eat whole unprocessed or minimally processed foods.     Food timing: Bf/ Lunch, Afternoon snack, Dinner -- try to keep foods in a set time frame 8AM to 7 pm.  Avoid late night eating.      Food Quantity goals in 1 day:  4+ non-starchy (low glycemic index) vegetables    3 moderate glycemic index fruit or vegetable choices   1 higher glycemic index food choices   6 Protein choices   5 fat choices     1 off plan items per week     Today we outlined the following beginning steps to put goals into action     We discussed today having a protein choice with every meal/snack --- meat eggs, yogurt, beans, protein shake (shared some ideas/recipes), peanut butter could count (usually a \"fat\").  Idea to use vegetable or possibly popcorn instead of chips if needed to get more \"volume\" for less  For afternoon snack and add in a low fat cheese stick.  Try to choose snacks from an approved snack list and have them easy to grab and go.      We discussed using alternate activities in the afternoon to prevent napping (if naps) like using the switch for exercise, chores, crafting/art, or homework.      Ensure get all sleep at night in 1 solid chunk as the best sleep happens more frequently the longer you stay asleep.      Try using mindfulness or meditation at night before bed (or before you decide to go downstairs to eat at night).  Use [employer], head space or Calm junior. Eat right now junior.     Initiate and maintain food journal, sample options provided today. Topic " Handout provided.   Bring your food journal to every visit.  We will adjust or continue your plan based on review of this data.       Write down your goals for leading a healthier lifestyle.      Create a list of advantages and disadvantages that will result from making lifestyle changes.     Work toward nutrition plan as outlined in today's handouts.      Commit to follow up --- loose, gain or stay the same.  The only way to fail is to quit.  Change will happen if you continue to manage and modify your plan.  We are here to help you do that no matter what happens in the interim.      Ultimate, long term goals for living a healthy life and maintaining a healthy weight:       The ultimate goal is to listen to your own internal cues: eat in response to hunger and stop when you are no longer hungry.      While you are developing the above skills, practice the guidelines outlined above.      Drink unsweetened beverages and aim to get 6-8 glasses of water daily.                             Avoid regular use of ultra-processed foods, sugary beverages, and artifical sweeteners (sucralose, splenda, equal, sweet-n-low), as they dramatically increase insulin and  worsen insulin resistance. Insulin resistance is a the most common risk factor for weight dysregulation in the [place].      Be mindful of the effect of natural sweeteners (erythritol, stevia, monkfruit):  disrupted micro-biome, diarrhea, and other GI upset. If you tolerate them, and they do not trigger sugar cravings for you, then use them as needed.       If you drink alcohol, Limit alcohol to less than 3 servings in a week.      Physical Activity Plan:     Achieve adequate NEAT (Non-Exercise Activity Thermogenesis) every day: hourly movement  while awake for at least 250 steps (approximately 5 minutes out of each hour).  Hourly movement prevents metabolic slow down that can happen with prolonged sitting.      Participate in regular dedicated physical  "activity  --- at least 150 minutes per week during active weight loss and increasing to 300 minutes per week in the weight maintenance phase.  Make at least 60 of those physical activity minutes resistance training.      Behavior Changes:      Understand that weight loss through sustainable changes will probably not provide quick results, but will provide lasting results.  Regaining lost weight is more damaging than never loosing it at all.      Understand that weight balance is a complex process with many possible risk factors that far exceed the traditional concept of caloric balance. Identify and begin to correct your specific risk factors. Lasting change come from new habit formation.  This takes time and consistent effort.       Create and maintain a food journal to promote nutritional awareness and accountability,  but do not aim to count calories.     Create a list of your values.  What really matters to you?  How do those values connect to your desire to be healthier?     Make a \"Advantages/Disadvantages list\" list of living a healthy life based on your values.  Post your Values and Advantages list where you can review it daily.      Record successes (\"worth it moments\") and results of new habits to promote continued motivation.   Create a list of personal catch phrases to use when challenges arise.     Develop mindful eating practices : Eat sitting down, preferably at a table.  Eat your food in a reflective, peaceful setting or in enjoyable company, not in front of a screen, behind the wheel of a car or in the grocery store aisle.   Chew Slowly and thoroughly -- chewing the 1st stage of digestion!      Develop and practice tools to recognize and address non-hunger eating, such as social situations, emotional eating, and cognitive distortions.      Eat on a plan, and eliminate spontaneous snacking. Avoid eating within 2 hours of going to bed or in the middle of the night.        Get adequate and restful " "sleep every night ---  7.5-8.5 hours a night is what most people require.  Consider a scheduled sleep and wake time if possible.       Practice good sleep hygiene --- no caffeine after lunch, use michael lighting in the evening and at night, Turn off \"blue light\" screens 1 hour before bed.       Identify personal stressors.  Modify what you can, and aim to balance what you cannot. Develop and practice daily relaxation techniques to balance stress.       Make time for rejuvenating activities and hobbies at least weekly.      Medical Management:     Identify and address weight positive health conditions such as sleep apnea, insulin resistance, thyroid dysfunction, cortisol excess, autoimmune conditions, food intolerances, slow metabolism, binge eating disorder, food addiction, chronic pain, insomnia, and depression.  Lab tests, specialized studies,  medication or surgery may be recommended.       Consider Revue calorimeter test to accurately measure nutritional needs.      Identify weight positive medications and modify treatment strategies if appropriate.      Utilize supplements to make up for specific identified dietary deficiencies.       Aim to keep and maintain a diverse micro-biome (healthy gut): avoid unnecessary antibiotics in your own self and in the meats and dairy products you use. Consume foods with probiotics or take a probiotic daily (like Udo's Adult Probiotic, VSL-3, Florastor, or Culturelle).       Our plan of action includes frequent visits over the next year to focus on education regarding the appropriate nutrition and physical activity best suited to our patient's individual needs, SMART goal setting and other strategies for behavior change, mindfulness, stress reduction,  accountability, and review of any initiated medical interventions.  Visits will space out over time with formation of new life habits and stabilization of the treatment plan. We will enlist the services of the nutritionist, and " may in the future consult with exercise physiology or emotional wellness.  Weekly educational classes are also available for extra support, if needed.     Roseann Bird MD  3/31/2022   As part of this visit, on the day of service, I spent 10 minutes reviewing the pt's chart for all data/events relevant to this visit, composing this note, and conducting other visit follow up activities.    I spent 55 minutes directly evaluating and/or counseling and communicating with the patient.    The total duration of time spent on the day of service for this encounter was 65 minutes.     Over half of today's 65 minute visit was spent in education and counseling regarding  Nutrition, physical activity, metabolism, and weight loss goals and benefits.     Over 50% of this time was spent providing clinical counseling: y.  Level of medical decision making: medium to high      Thank you very much for allowing us to participate in the care of your patient. Please do not hesitate to call or e-mail if there are any questions.      This document was created using Dragon dictation software.  There might be some typographical errors due to this technology.

## 2022-03-31 NOTE — ASSESSMENT & PLAN NOTE
Great to see you today, stay well, stay safe, stay active.    Recommend set up team at [facility]-   1.  Registered Dietician (Jaxon Peterson or Katie Nye)   2.  Exercise Physiologist (Shannan SRINIVASAN).6mwt  3.  CBT With therapy : [facility]-referral done  [facility]- [phone]    Nutrition:  Start to keep food journal-Handouts shared -low glycemic/ meal plan  3 balanced meals at set times, 1-2 healthy snacks.  Add protein to each meal and snacks.  No skipping meals, no eating in between meals,distract self.  2 cups of green veggies for lunch and dinner.  Increase fluids to 64 oz of fluids /d.                 Exercise:  Add stretches or exercise -walk 10-30 min x 3 /week.  Stress relieving activities 5-10 minutes of yoga or deep breathing, stretching daily.    Mindfulness and sleep:  Good sleep hygiene. Melatonin , lavender oil.  Sleep study - holding off on referral .    JUNIOR options:  1.  TowerJazzce [id number] code  2.  SOMRYST junior  3.  EatRightNow junior by rina and Dr. Milady Smart  Discount code HCP20.    Medications:  Medications discussed-holding off  on adding weight loss meds for now  Vit d 2000 international units per day  Vitamin B12 daily  Probiotic daily  Follow-up in 3 months.

## 2022-03-31 NOTE — ASSESSMENT & PLAN NOTE
Elevated cholesterol levels often come from a blend of genetic and environmental factors.  Maximizing nutrition and physical activity is an important adjunct to medical management to treat elevated cholesterol levels and reduce the risk of arthrosclerotic disease such as heart attacks, strokes, and peripheral arterial disease.  See plan for obesity

## 2022-04-25 ENCOUNTER — OFFICE VISIT (OUTPATIENT)
Dept: BARIATRICS/WEIGHT MGMT | Facility: CLINIC | Age: 47
End: 2022-04-25
Payer: COMMERCIAL

## 2022-04-25 VITALS
SYSTOLIC BLOOD PRESSURE: 120 MMHG | HEIGHT: 67 IN | BODY MASS INDEX: 37.53 KG/M2 | DIASTOLIC BLOOD PRESSURE: 84 MMHG | WEIGHT: 239.1 LBS | HEART RATE: 75 BPM | OXYGEN SATURATION: 97 %

## 2022-04-25 DIAGNOSIS — E78.5 HYPERLIPIDEMIA, UNSPECIFIED HYPERLIPIDEMIA TYPE: ICD-10-CM

## 2022-04-25 DIAGNOSIS — E66.812 CLASS 2 OBESITY WITH BODY MASS INDEX (BMI) OF 37.0 TO 37.9 IN ADULT, UNSPECIFIED OBESITY TYPE, UNSPECIFIED WHETHER SERIOUS COMORBIDITY PRESENT: Primary | ICD-10-CM

## 2022-04-25 PROCEDURE — 99999 PR OFFICE/OUTPT VISIT,PROCEDURE ONLY: CPT | Performed by: FAMILY MEDICINE

## 2022-04-25 PROCEDURE — 94618 PULMONARY STRESS TESTING: CPT | Performed by: FAMILY MEDICINE

## 2022-04-25 NOTE — PROGRESS NOTES
"DETAILS OF VISIT     Consulting Service:  [facility] Comprehensive Weight and Wellness Program - Exercise Physiologist    Referring Physician: Roseann Bird MD    PCP: Mignon Encarnacion DO    Reason for Consultation: Medical Weight Management   VISIT DESCRIPTION      Barbra Norwood is a 47 y.o. female here for an initial exercise physiology  evaluation.    Current Wt:   Wt Readings from Last 1 Encounters:   04/25/22 108 kg (239 lb 1.6 oz)     Ht:   Ht Readings from Last 1 Encounters:   04/25/22 1.702 m (5' 7.01\")          B/P: 120/84    Wt Readings from Last 3 Encounters:   04/25/22 108 kg (239 lb 1.6 oz)   03/31/22 110 kg (242 lb 6.4 oz)   09/02/21 107 kg (236 lb 12.8 oz)     BMI Readings from Last 3 Encounters:   04/25/22 37.44 kg/m²   03/31/22 37.97 kg/m²   09/02/21 37.09 kg/m²       Aerobic Training: She is currently walking tries to get 2-3 days/week, 15 minutes with dogs per time. Not consistent. Post exercise ankle pain is a barrier. Set mobility/ROM/strenghtening exercises for ankle. Recommended continuing with low impact exercises.     Plan: Build consistency with 2-3 days/week of 15 minutes of walking. Track on theAudience It junior. Prepare night before if walking in the morning first thing. Create mini habit with music or podcast to help initiate activity.    Resistance Training: None currently. Discussed importance of including strength training to build lean muscle mass, burn calories more efficiently, and increase metabolism. Gave sample exercise handout. Gave recommendations on where to purchase equipment if interested.    Plan: Start with 1-2 days/week of strength training, 6-8 exercises per session. Start with full body routine.      Flexibility: Discussed importance of including strength training to build lean muscle mass, burn calories more efficiently, and increase metabolism.    Plan: Add dynamic stretching prior to exercise for 5 minutes to help the body prepare for exercise and add static stretching " following exercise for 5 minutes to help increase ROM/mobility and prepare body for rest.     Daily PA: Discussed breaking up extended periods of sitting with hourly movement of 3-5 minutes or standing vs sitting. Work on increasing steps/day and will set a specific goal next visit.     Sleep/Stress: Did not discuss today.    Nutrition: Seeing Katie on 4/27/2022.    We reviewed exercise intensity, THR, and other exercise recommendations.  We discussed long-term exercise and the tie with health. Pt stated they understood that they needed to exercise to maintain health and lost weight. Pt given and explained Ex Rx.    Time Spent with Patient for face to face office visit: 1 hour    PATIENT HISTORY AND ASSESSMENT     HPI:  was over weight as a child.  She started having trouble managing her weight at age 9.  Her first weight loss attempt was at age 16.  Her lowest adult weight was 165 pounds at 24 years of age.  Highest adult weight of 243 pounds at age 47.  Her best weight loss was 30 pounds with Optiva in 2019. Over the last year her weight has been  increasing.    Depression, hyperlipidemia, snoring    OTHER COMPLAINT: Arthritis- finger joint, joint pain, ankle pain (started 2-3 years ago)    PRECAUTIONS/LIMITS FOR EXERCISE: SOB with exertion- low endurance    RECENT FALL: No    CURRENT EXERCISE FUNCTION:   Current: walking tries to get 2-3 days/week, 15 minutes with dogs  Equipment: none  Interested in: low impact  Consistent: intermittent over the past month  Best time of day for exercise: in the morning (7:30- 9 AM)    PAST EXERCISE HISTORY: walking, using a treadmill, going to a gym and Pilates    EXERCISE/ENVIRONMENTAL BARRIERS: pain in ankles (experiences it post exercise)    RECREATIONAL ACTIVITES: N/A    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 Develop consistency with 2-3 days/week. Goal Status: NOT MET    2. Increase endurance. Goal Status: NOT MET    FOLLOW-UP GOALS:    1. Is the patient following the exercise prescription? Goal Status: NOT MET     2.  Goal Status: NOT MET    3.  Goal Status: NOT MET    TRAINING TOOLS REVIEWED:     DEVICE: Fit Bit and Smartphone Apps     MEASUREMENTS AND TESTING     BIOMETRIC MEASUREMENTS:     Biometric Measurements  Weight: 108 kg (239 lb 1.6 oz)  Height: 170.2 cm (5' 7.01\")  BMI (Calculated): 37.4    6 MINUTE WALK TEST EVALUATIONS:     6 Minute Walk Test Evaluation  Bariatric Assessment Time Frame:: Initial  Resting HR: 75  Resting O2 Sat: 97 %  Resting BP: 120/84  3 MIN HR: 104  3 MIN O2 Sat: 97 %  3 MIN RPE: 3  6 MIN HR: 100  6 MIN O2 Sat: 98 %  6 MIN RPE: 5  Exercise BP: 128/82  1 Min Recovery HR: 79  1 Min Recovery O2 Sat: 99 %  1 Min Recovery BP: 122/82  5 Minute Recovery HR: 74  5 Minute Recover O2 Sat: 98 %  5 Minute Recovery BP: 112/82  Total Feet: 1250 feet  Pace (MPH): 2.37 mph  METs: 2.82 METs     Completed 6 MWT today to assess aerobic and functional capacity.   RECOMMENDATIONS   BMI 37.0-37.9, adult  1. Build consistency with 2-3 days/week of 15 minutes of walking. Prepare night before if walking in the morning first thing.   2. Track on [employer] junior.   3. Create mini habit with music or podcast to help initiate activity.  4. Start with 1-2 days/week of strength training, 6-8 exercises per session. Start with full body routine.  5. Increase daily steps by breaking up extended periods of sitting.  6. Add in stretching before/after exercise and try ankle ROM/mobility/strengthening exercises.     Shannan Cabral, MS  4/26/2022     Thank you very much for allowing us to participate in the care of " your patient. Please do not hesitate to call or email if there are any questions.

## 2022-04-26 NOTE — PATIENT INSTRUCTIONS
1. Build consistency with 2-3 days/week of 15 minutes of walking. Prepare night before if walking in the morning first thing.   2. Track on Lose It junior.   3. Create mini habit with music or podcast to help initiate activity.  4. Start with 1-2 days/week of strength training, 6-8 exercises per session. Start with full body routine.  5. Increase daily steps by breaking up extended periods of sitting.  6. Add in stretching before/after exercise and try ankle ROM/mobility/strengthening exercises.

## 2022-04-27 ENCOUNTER — CLINICAL SUPPORT (OUTPATIENT)
Dept: BARIATRICS/WEIGHT MGMT | Facility: CLINIC | Age: 47
End: 2022-04-27
Payer: COMMERCIAL

## 2022-04-27 DIAGNOSIS — E55.9 VITAMIN D DEFICIENCY: ICD-10-CM

## 2022-04-27 DIAGNOSIS — E78.49 OTHER HYPERLIPIDEMIA: ICD-10-CM

## 2022-04-27 PROCEDURE — 97802 MEDICAL NUTRITION INDIV IN: CPT | Performed by: DIETITIAN, REGISTERED

## 2022-04-27 NOTE — PATIENT INSTRUCTIONS
Problem List Items Addressed This Visit          Digestive    Vitamin D deficiency       Endocrine/Metabolic    Other hyperlipidemia       Other    BMI 37.0-37.9, adult - Primary     Goal:  1. Increase variety with lunches and snack options  2. Hydration increase to 40 fl oz of water daily.  3. 20 minute walk 3 x weekly.

## 2022-04-27 NOTE — PROGRESS NOTES
"    DETAILS OF VISIT     Consulting Service:  [facility] Comprehensive Weight and Wellness Program - Dietitian    Referring Physician: Roseann Bird MD.    PCP: Mignon Encarnacion DO    Reason for Consultation: Medical Weight Management        VISIT DESCRIPTION         Barbra Norwood is a 47 y.o. female here for an initial nutrition evaluation.      Current Wt: weight from earlier this week  Wt Readings from Last 1 Encounters:   04/25/22 108 kg (239 lb 1.6 oz)     Ht:   Ht Readings from Last 1 Encounters:   04/25/22 1.702 m (5' 7.01\")        BMI: There is no height or weight on file to calculate BMI.    Pt Goal Wt/Reasonable BW: <200 lb    Wt Readings from Last 5 Encounters:   04/25/22 108 kg (239 lb 1.6 oz)   03/31/22 110 kg (242 lb 6.4 oz)   09/02/21 107 kg (236 lb 12.8 oz)   05/10/21 106 kg (232 lb 9.6 oz)   07/22/20 105 kg (232 lb 3.2 oz)     BMI Readings from Last 5 Encounters:   04/25/22 37.44 kg/m²   03/31/22 37.97 kg/m²   09/02/21 37.09 kg/m²   05/10/21 36.43 kg/m²   07/22/20 36.37 kg/m²       I met with Barbra Norwood today for Nutrition Counseling [Z71.3] and dietary education related to the following:    E55.9 Vit D deficiency, E78.4 - Other hyperlipidemia  and Z68.37 - BMI 37.0-37.9, adult    Nutrition: Barbra has been logging her foods in lose it and has used lose it for years. Feels like her weight loss is slow (has lost about 4 pounds in the past month) we discussed weight loss goals and encouraged her to aim fro 1-2 lbs per week. Due to changes in her diet (for the better) she finds she is not getting the 3pm slump and has been sleeping better. Has been aiming for more protein and trying to cut back on her salty snacks she usually craves. admits to eating with getting bored in the past but has made an effort to no do this. Discussed better brands for processed salty snacks. Encouraged more protein at lunch and for snack throughout the day.     Physical Activity: typically does not exercise but has been " trying to make an effort to get in at least a 10min walk 2-3 x per week.  Today she did a 22 min walk, which she feels she has times for and will continue with that longer route in the future.    Sleep: sleeps okay, 10pm-6am.    Emotional Wellness: lots of family/home stress  and oldest son have . aspergers. And youngest has autism.    Other: Vit D def noted, taking 1,000 IU daily.    Time Spent with Patient for face to face office visit: 45 minutes    MEDICATIONS AND ALLERGIES     Current Outpatient Medications on File Prior to Visit   Medication Sig Dispense Refill   • ascorbic acid (VITAMIN C) 500 mg tablet Take 500 mg by mouth daily.     • biotin 300 mcg tablet Take by mouth daily.     • cholecalciferol, vitamin D3, 1,000 unit (25 mcg) tablet Take 1,000 Units by mouth daily.     • citalopram 40 mg tablet TAKE 1 TABLET BY MOUTH EVERY DAY 90 tablet 1   • desog-e.estradioL/e.estradioL (KARIVA) 0.15-0.02 mgx21 /0.01 mg x 5 per tablet take 1 tablet by oral route  every day     • FISH OIL-omega-3 fatty acids (FISH OIL) 340-1,000 mg capsule Take by mouth 3 (three) times a day.       No current facility-administered medications on file prior to visit.         Bactrim [sulfamethoxazole-trimethoprim] and Penicillins    CURRENT DIET        Plan:   Meals: 3 meals 1 snack  (B) 6:30-7:30 coffee 1/3 cup almond milk and 1 tbsp sugar  Smoothie ( 1 scoop collagen, 2 scoops cava protein plant based, 1-1.5 cups frozen fruit)  2 pieces of wheat toast with avocado  2 eggs and leftover veggies  (L)11am jean claude lettuce leftover protein  3pm jena orgain shake  (D) 6-8pm protein, veggies    Snacks:   Hydration: 1 coffee per day 20 fl oz water x 2  1 can of diet coke maybe   RECOMMENDATIONS   BMI 37.0-37.9, adult  Goal:  1. Increase variety with lunches and snack options  2. Hydration increase to 40 fl oz of water daily.  3. 20 minute walk 3 x weekly.      Katie Nye, FATUMAN, LDN  4/27/2022     Thank you very much for allowing  us to participate in the care of your patient. Please do not hesitate to call or email if there are any questions

## 2022-04-27 NOTE — ASSESSMENT & PLAN NOTE
Goal:  1. Increase variety with lunches and snack options  2. Hydration increase to 40 fl oz of water daily.  3. 20 minute walk 3 x weekly.

## 2022-05-31 ENCOUNTER — OFFICE VISIT (OUTPATIENT)
Dept: BARIATRICS/WEIGHT MGMT | Facility: CLINIC | Age: 47
End: 2022-05-31
Payer: COMMERCIAL

## 2022-05-31 VITALS
OXYGEN SATURATION: 98 % | HEIGHT: 67 IN | DIASTOLIC BLOOD PRESSURE: 74 MMHG | TEMPERATURE: 97.7 F | BODY MASS INDEX: 36.96 KG/M2 | HEART RATE: 80 BPM | RESPIRATION RATE: 18 BRPM | WEIGHT: 235.5 LBS | SYSTOLIC BLOOD PRESSURE: 128 MMHG

## 2022-05-31 DIAGNOSIS — R51.9 INTRACTABLE HEADACHE, UNSPECIFIED CHRONICITY PATTERN, UNSPECIFIED HEADACHE TYPE: Primary | ICD-10-CM

## 2022-05-31 DIAGNOSIS — E78.49 OTHER HYPERLIPIDEMIA: ICD-10-CM

## 2022-05-31 DIAGNOSIS — F32.5 MAJOR DEPRESSIVE DISORDER IN FULL REMISSION, UNSPECIFIED WHETHER RECURRENT (CMS/HCC): ICD-10-CM

## 2022-05-31 DIAGNOSIS — R06.83 SNORING: ICD-10-CM

## 2022-05-31 PROBLEM — G44.89 OTHER HEADACHE SYNDROME: Status: ACTIVE | Noted: 2021-09-02

## 2022-05-31 PROCEDURE — 3008F BODY MASS INDEX DOCD: CPT | Performed by: FAMILY MEDICINE

## 2022-05-31 PROCEDURE — 99214 OFFICE O/P EST MOD 30 MIN: CPT | Performed by: FAMILY MEDICINE

## 2022-05-31 RX ORDER — NALTREXONE HYDROCHLORIDE 50 MG/1
25 TABLET, FILM COATED ORAL DAILY
Qty: 45 TABLET | Refills: 0 | Status: SHIPPED | OUTPATIENT
Start: 2022-05-31 | End: 2022-08-29

## 2022-06-14 ENCOUNTER — TELEMEDICINE (OUTPATIENT)
Dept: BARIATRICS/WEIGHT MGMT | Facility: CLINIC | Age: 47
End: 2022-06-14
Payer: COMMERCIAL

## 2022-06-14 VITALS — WEIGHT: 234 LBS | BODY MASS INDEX: 36.65 KG/M2

## 2022-06-14 DIAGNOSIS — Z71.3 DIETARY COUNSELING: ICD-10-CM

## 2022-06-14 DIAGNOSIS — E55.9 VITAMIN D DEFICIENCY: Primary | ICD-10-CM

## 2022-06-14 DIAGNOSIS — E78.49 OTHER HYPERLIPIDEMIA: ICD-10-CM

## 2022-06-14 PROCEDURE — 97803 MED NUTRITION INDIV SUBSEQ: CPT | Mod: 95 | Performed by: DIETITIAN, REGISTERED

## 2022-06-14 NOTE — PROGRESS NOTES
DETAILS OF VISIT     Consulting Service:  [facility] Comprehensive Weight and Wellness Program - Dietitian    Referring Physician: Roseann Bird MD.    PCP: Mignon Encarnacion DO    Reason for Consultation: Medical Weight Management     Verification of Patient Location:  The patient affirms they are currently located in the following state: [place]  Request for Consent:   Audio and Video Encounter   Katie, my name is Katie ROSEHerlinda Nye RD.  Before we proceed, can you please verify your identification by telling me your full name and date of birth?  Can you tell me who is in the room with you?    You and I are about to have a telemedicine check-in or visit because you have requested it.  This is a live video-conference.  I am a real person, speaking to you in real time.  There is no one else with me on the video-conference.  However, when we use ([employer], [employer], etc) it is important for you to know that the video-conference may not be secure or private.  I am not recording this conversation and I am asking you not to record it.  This telemedicine visit will be billed to your health insurance or you, if you are self-insured.  You understand you will be responsible for any copayments or coinsurances that apply to your telemedicine visit.  Communication platform used for this encounter:  [employer] Video Visit (no Zoom)     Before starting our telemedicine visit, I am required to get your consent for this virtual check-in or visit by telemedicine. Do you consent?    Patient Response to Request for Consent:  Yes  Time Spent:  I spent 23 minutes on this date of service performing the following activities:   providing counseling and education     VISIT DESCRIPTION         Barbra Norwood is a 47 y.o. female here for a follow up nutrition evaluation.      Current Wt: weight from earlier this week  Wt Readings from Last 1 Encounters:   06/14/22 106 kg (234 lb)     Ht:   Ht Readings from Last 1 Encounters:   05/31/22 1.702  "m (5' 7\")        BMI: Body mass index is 36.65 kg/m².    Pt Goal Wt/Reasonable BW: <200 lb    Wt Readings from Last 5 Encounters:   06/14/22 106 kg (234 lb)   05/31/22 107 kg (235 lb 8 oz)   04/25/22 108 kg (239 lb 1.6 oz)   03/31/22 110 kg (242 lb 6.4 oz)   09/02/21 107 kg (236 lb 12.8 oz)     BMI Readings from Last 5 Encounters:   06/14/22 36.65 kg/m²   05/31/22 36.88 kg/m²   04/25/22 37.44 kg/m²   03/31/22 37.97 kg/m²   09/02/21 37.09 kg/m²       I met with Barbra Norwood today for Nutrition Counseling [Z71.3] and dietary education related to the following:    E55.9 Vit D deficiency, E78.4 - Other hyperlipidemia  and Z68.37 - BMI 37.0-37.9, adult    Nutrition: Barbra has been trying to track her foods more often. She tooks a break from tracking thinking she could do it without it but tracking again has made her realize she needs to hold herself most acocuntable. Not having difficulty with protein but is struggling to stay with calorie goals. Dinner she feels is the time when she portions the least because she  Is exhausted from thinking about it all day.  Hydration is okay.  she drinks about 40-60 fl oz of water.  Has been using options from the better brands for processed salty snacks. She is frustrated with slow weight loss but we discussed focusing on progress which is non-scale related.  Barbra started on naltrexone tolerating fine.      Physical Activity: typically does strength 1 x weekly and walking a 15 min at least 3 x week- encouraged second walk or longer duration movement/standing every hour etc.    Sleep: sleeps okay, 10pm-6am.    Emotional Wellness: lots of family/home stress  and oldest son have . aspergers. And youngest has autism.    Other: Vit D def noted, taking 1,000 IU daily.     MEDICATIONS AND ALLERGIES     Current Outpatient Medications on File Prior to Visit   Medication Sig Dispense Refill   • ascorbic acid (VITAMIN C) 500 mg tablet Take 500 mg by mouth daily.     • biotin " 300 mcg tablet Take by mouth daily.     • cholecalciferol, vitamin D3, 1,000 unit (25 mcg) tablet Take 1,000 Units by mouth daily.     • citalopram (celeXA) 40 mg tablet TAKE 1 TABLET BY MOUTH EVERY DAY 90 tablet 0   • desog-e.estradioL/e.estradioL (KARIVA) 0.15-0.02 mgx21 /0.01 mg x 5 per tablet take 1 tablet by oral route  every day     • FISH OIL-omega-3 fatty acids (FISH OIL) 340-1,000 mg capsule Take by mouth 3 (three) times a day.     • nalTREXone (DEPADE) 50 mg tablet Take 0.5 tablets (25 mg total) by mouth daily. 45 tablet 0     No current facility-administered medications on file prior to visit.         Bactrim [sulfamethoxazole-trimethoprim] and Penicillins    CURRENT DIET        Plan: LGI  Meals: 3 meals 1 snack  (B) 6:30-7:30 coffee 1/3 cup almond milk and 1 tbsp sugar  Smoothie ( 1 scoop collagen, 2 scoops cava protein plant based, 1-1.5 cups frozen fruit)  2 pieces of wheat toast with avocado  2 eggs and leftover veggies  (L)11am jean claude lettuce leftover protein  3pm jena orgain shake  (D) 6-8pm protein, veggies    Snacks:   Hydration: 1 coffee per day 20 fl oz water x 2     RECOMMENDATIONS   BMI 37.0-37.9, adult  Goal:  1. Logging in lose it consistently for accountability.  2. Hydration increase to 60 fl oz of water daily.  3. 30 minute walk 3 x weekly.      Katie Nye RDN, LDN  6/14/2022     Thank you very much for allowing us to participate in the care of your patient. Please do not hesitate to call or email if there are any questions

## 2022-06-14 NOTE — ASSESSMENT & PLAN NOTE
Goal:  1. Logging in lose it consistently for accountability.  2. Hydration increase to 60 fl oz of water daily.  3. 30 minute walk 3 x weekly.

## 2022-08-02 ENCOUNTER — TELEMEDICINE (OUTPATIENT)
Dept: BARIATRICS/WEIGHT MGMT | Facility: CLINIC | Age: 47
End: 2022-08-02
Payer: COMMERCIAL

## 2022-08-02 VITALS — BODY MASS INDEX: 36.02 KG/M2 | WEIGHT: 230 LBS

## 2022-08-02 DIAGNOSIS — Z71.3 DIETARY COUNSELING: ICD-10-CM

## 2022-08-02 PROCEDURE — 97803 MED NUTRITION INDIV SUBSEQ: CPT | Mod: 95 | Performed by: DIETITIAN, REGISTERED

## 2022-08-02 PROCEDURE — 99999 PR OFFICE/OUTPT VISIT,PROCEDURE ONLY: CPT | Mod: 95 | Performed by: DIETITIAN, REGISTERED

## 2022-08-02 NOTE — PROGRESS NOTES
DETAILS OF VISIT     Consulting Service:  [facility] Comprehensive Weight and Wellness Program - Dietitian    Referring Physician: Roseann Bird MD.    PCP: Mignon Encarnacion DO    Reason for Consultation: Medical Weight Management     Verification of Patient Location:  The patient affirms they are currently located in the following state: [place]  Request for Consent:   Audio and Video Encounter   Katie, my name is Katie ROSEHerlinda Nye RD.  Before we proceed, can you please verify your identification by telling me your full name and date of birth?  Can you tell me who is in the room with you?    You and I are about to have a telemedicine check-in or visit because you have requested it.  This is a live video-conference.  I am a real person, speaking to you in real time.  There is no one else with me on the video-conference.  However, when we use ([employer], [employer], etc) it is important for you to know that the video-conference may not be secure or private.  I am not recording this conversation and I am asking you not to record it.  This telemedicine visit will be billed to your health insurance or you, if you are self-insured.  You understand you will be responsible for any copayments or coinsurances that apply to your telemedicine visit.  Communication platform used for this encounter:  [employer] Video Visit (no Zoom)     Before starting our telemedicine visit, I am required to get your consent for this virtual check-in or visit by telemedicine. Do you consent?    Patient Response to Request for Consent:  Yes  Time Spent:  I spent 12 minutes on this date of service performing the following activities:   providing counseling and education     VISIT DESCRIPTION         Barbra Norwood is a 47 y.o. female here for a follow up nutrition evaluation.      Current Wt: weight from earlier this week  Wt Readings from Last 1 Encounters:   08/02/22 104 kg (230 lb)     Ht:   Ht Readings from Last 1 Encounters:   05/31/22 1.702  "m (5' 7\")        BMI: Body mass index is 36.02 kg/m².    Pt Goal Wt/Reasonable BW: <200 lb    Wt Readings from Last 5 Encounters:   08/02/22 104 kg (230 lb)   06/14/22 106 kg (234 lb)   05/31/22 107 kg (235 lb 8 oz)   04/25/22 108 kg (239 lb 1.6 oz)   03/31/22 110 kg (242 lb 6.4 oz)     BMI Readings from Last 5 Encounters:   08/02/22 36.02 kg/m²   06/14/22 36.65 kg/m²   05/31/22 36.88 kg/m²   04/25/22 37.44 kg/m²   03/31/22 37.97 kg/m²       I met with Barbra Torrescelina today for Nutrition Counseling [Z71.3] and dietary education related to the following:    E55.9 Vit D deficiency, E78.4 - Other hyperlipidemia  and Z68.37 - BMI 37.0-37.9, adult    Nutrition: Barbra did not get back to the tracking. Trying to be aware and track her in head more.  Trying to mindful of carbohydrate portions.  Doing a good job with vegetables, having a salad for lunch most days.  Started drinking protein to get more protein. Hydration is okay getting at least 40 fl oz of water daily. Also having a proteins shake in the morning.  Discussed using zucchini noodles when family is having pasta.     Physical Activity: has increased exercise and walking at least 1 mile every day.  typically does strength 1 x weekly or 60 min walk    Sleep: sleeps good, no concerns, 10pm-6am.    Emotional Wellness: lots of family/home stress  and oldest son have . aspergers. And youngest has autism.    Other: Vit D def noted, taking 1,000 IU daily.  Naltrexone.     MEDICATIONS AND ALLERGIES     Current Outpatient Medications on File Prior to Visit   Medication Sig Dispense Refill   • ascorbic acid (VITAMIN C) 500 mg tablet Take 500 mg by mouth daily.     • biotin 300 mcg tablet Take by mouth daily.     • cholecalciferol, vitamin D3, 1,000 unit (25 mcg) tablet Take 1,000 Units by mouth daily.     • citalopram (celeXA) 40 mg tablet TAKE 1 TABLET BY MOUTH EVERY DAY 90 tablet 0   • desog-e.estradioL/e.estradioL (KARIVA) 0.15-0.02 mgx21 /0.01 mg x 5 per " tablet take 1 tablet by oral route  every day     • FISH OIL-omega-3 fatty acids (FISH OIL) 340-1,000 mg capsule Take by mouth 3 (three) times a day.     • nalTREXone (DEPADE) 50 mg tablet Take 0.5 tablets (25 mg total) by mouth daily. 45 tablet 0     No current facility-administered medications on file prior to visit.         Bactrim [sulfamethoxazole-trimethoprim] and Penicillins    CURRENT DIET        Plan: LGI  Meals: 3 meals 1 snack  7:30-8pm       RECOMMENDATIONS   BMI 37.0-37.9, adult  Goal:  1. Continue to be mindful with low carb swaps at meals.  2. 30 minute walk , at least 1 mile daily. With 1 day strength weekly.      Katie Nye, RDN, LDN  8/2/2022     Thank you very much for allowing us to participate in the care of your patient. Please do not hesitate to call or email if there are any questions

## 2022-08-02 NOTE — ASSESSMENT & PLAN NOTE
Goal:  1. Continue to be mindful with low carb swaps at meals.  2. 30 minute walk , at least 1 mile daily. With 1 day strength weekly.

## 2022-08-07 DIAGNOSIS — F33.42 RECURRENT MAJOR DEPRESSIVE DISORDER, IN FULL REMISSION (CMS/HCC): ICD-10-CM

## 2022-08-08 RX ORDER — CITALOPRAM 40 MG/1
TABLET, FILM COATED ORAL
Qty: 90 TABLET | Refills: 0 | Status: SHIPPED | OUTPATIENT
Start: 2022-08-08 | End: 2022-10-19 | Stop reason: SDUPTHER

## 2022-08-30 DIAGNOSIS — Z00.00 ROUTINE PHYSICAL EXAMINATION: Primary | ICD-10-CM

## 2022-09-01 ENCOUNTER — OFFICE VISIT (OUTPATIENT)
Dept: BARIATRICS/WEIGHT MGMT | Facility: CLINIC | Age: 47
End: 2022-09-01
Payer: COMMERCIAL

## 2022-09-01 VITALS
BODY MASS INDEX: 36.46 KG/M2 | SYSTOLIC BLOOD PRESSURE: 123 MMHG | WEIGHT: 232.3 LBS | DIASTOLIC BLOOD PRESSURE: 79 MMHG | RESPIRATION RATE: 18 BRPM | HEART RATE: 96 BPM | TEMPERATURE: 97.9 F | OXYGEN SATURATION: 98 % | HEIGHT: 67 IN

## 2022-09-01 DIAGNOSIS — F32.5 MAJOR DEPRESSIVE DISORDER IN FULL REMISSION, UNSPECIFIED WHETHER RECURRENT (CMS/HCC): ICD-10-CM

## 2022-09-01 DIAGNOSIS — E78.49 OTHER HYPERLIPIDEMIA: ICD-10-CM

## 2022-09-01 DIAGNOSIS — R06.83 SNORING: Primary | ICD-10-CM

## 2022-09-01 PROCEDURE — 3008F BODY MASS INDEX DOCD: CPT | Performed by: FAMILY MEDICINE

## 2022-09-01 PROCEDURE — 99214 OFFICE O/P EST MOD 30 MIN: CPT | Performed by: FAMILY MEDICINE

## 2022-09-01 RX ORDER — NALTREXONE HYDROCHLORIDE 50 MG/1
25 TABLET, FILM COATED ORAL DAILY
Qty: 45 TABLET | Refills: 0 | Status: SHIPPED | OUTPATIENT
Start: 2022-09-01 | End: 2022-09-28 | Stop reason: ALTCHOICE

## 2022-09-01 NOTE — PROGRESS NOTES
DETAILS OF VISIT     Consulting Service:  [facility] and Wellness Program    Referring Physician: PCP    Reason for Consultation:   Chief Complaint   Patient presents with    Weight Management       PCP: Mignon Encarnacion,    HISTORY OF PRESENT ILLNESS        Barbra Norwood is a 47 y.o. 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      Last Visit recap:  Last seen- 5.31.22  Lost almost 12 #over last 12 weeks.    Medications used to support lifestyle modifications:Naltrexone     Interval history:   Chart review done since last visit.    Labs due Wednesday has orders  Reviewed most recent labs, Reviewed last Registered Dietitian's notes, Reviewed last Exercise physiologist's note, Reviewed Medical Doctor's note and Reviewed PCP/Specialists' recent notes    Concerns or questions:   Medication options-not sure if naltrexone is helping much.  Discussed Saxenda/GLP-1 as an option.  We will consider pending labs next week.  Nutrition:  Katie 8.2.22  Not tracking calories   Successes:  Meeting protein goals most days of the week, Keeping to a fasting schedule of 12 or more hours daily, Keeping to meal structure (no spontaneous snacking), Not struggling with hunger, Not struggling with food cravings, Limiting off plan items to 1 per week and Never eats naked carbs     Challenges:  Inadequate water intake      Physical activity:  Tracking activity with a fitness watch, Consistently meeting hourly movement goals, Getting 8-9,000 steps per day on average, Exercising 6 days per week, Cardio 3 days per week, Resistance 1 days a week, Currently in physical therapy, Physical activity is improving and Sprained R hip flexor, has a     Sleep:   Sleeping 7-8  "hours a night on average and Sleep is restful    Stress:   moderate, Contributing factors include family, mom with positional vertigo, Practicing mindfulness most days of the week and Dedicated relaxation time 10 minutes per day      PAST MEDICAL AND SURGICAL HISTORY        Past Medical History:   Diagnosis Date    Arthritis     Depression        Past Surgical History:   Procedure Laterality Date     SECTION      2x    CYST REMOVAL  2019    TONSILLECTOMY         MEDICATIONS        Current Outpatient Medications on File Prior to Visit   Medication Sig Dispense Refill    ascorbic acid (VITAMIN C) 500 mg tablet Take 500 mg by mouth daily.      biotin 300 mcg tablet Take by mouth daily.      cholecalciferol, vitamin D3, 1,000 unit (25 mcg) tablet Take 1,000 Units by mouth daily.      citalopram (celeXA) 40 mg tablet TAKE 1 TABLET BY MOUTH EVERY DAY 90 tablet 0    desog-e.estradioL/e.estradioL (KARIVA) 0.15-0.02 mgx21 /0.01 mg x 5 per tablet take 1 tablet by oral route  every day      FISH OIL-omega-3 fatty acids (FISH OIL) 340-1,000 mg capsule Take by mouth 3 (three) times a day.       No current facility-administered medications on file prior to visit.       ALLERGIES        Bactrim [sulfamethoxazole-trimethoprim] and Penicillins    SOCIAL HISTORY        Social History     Tobacco Use    Smoking status: Never Smoker    Smokeless tobacco: Never Used   Substance Use Topics    Alcohol use: Yes     Comment: rarely    Drug use: No       REVIEW OF SYSTEMS      Review of Systems    Review of Systems   All other systems reviewed and are negative.       PHYSICAL EXAMINATION      Visit Vitals  /79 (BP Location: Left upper arm, Patient Position: Sitting)   Pulse 96   Temp 36.6 °C (97.9 °F) (Temporal)   Resp 18   Ht 1.702 m (5' 7\")   Wt 105 kg (232 lb 4.8 oz)   SpO2 98%   BMI 36.38 kg/m²      Body mass index is 36.38 kg/m².  Waist Measurement: 36 in    BP Readings from Last 3 Encounters:   22 " 123/79   05/31/22 128/74   04/25/22 120/84     Wt Readings from Last 3 Encounters:   09/01/22 105 kg (232 lb 4.8 oz)   08/02/22 104 kg (230 lb)   06/14/22 106 kg (234 lb)       Physical Exam  Vitals and nursing note reviewed.   Constitutional:       Appearance: She is well-developed. She is obese.   HENT:      Head: Atraumatic.      Right Ear: External ear normal.      Left Ear: External ear normal.      Nose: Nose normal.      Mouth/Throat:      Mouth: Mucous membranes are moist.      Comments: Mallampati score 2+  Eyes:      Conjunctiva/sclera: Conjunctivae normal.   Cardiovascular:      Rate and Rhythm: Normal rate.   Pulmonary:      Effort: Pulmonary effort is normal.   Abdominal:      General: Bowel sounds are normal.   Musculoskeletal:         General: Normal range of motion.      Cervical back: Normal range of motion.   Skin:     General: Skin is warm and dry.   Neurological:      General: No focal deficit present.      Mental Status: She is alert and oriented to person, place, and time.   Psychiatric:         Mood and Affect: Mood normal.         Behavior: Behavior normal.         Thought Content: Thought content normal.         Judgment: Judgment normal.           LABS / IMAGING / EKG        Reviewed the following Labs:    Lab Results   Component Value Date    HGBA1C 5.4 07/05/2019    HGBA1C 5.6 09/29/2016     Lab Results   Component Value Date    CHOL 218 (H) 05/17/2021    CHOL 240 (H) 07/05/2019    CHOL 208 (H) 09/29/2016     Lab Results   Component Value Date    HDL 65 05/17/2021    HDL 56 07/05/2019    HDL 60 09/29/2016     Lab Results   Component Value Date    LDLCALC 123 (H) 05/17/2021    LDLCALC 148 (H) 07/05/2019    LDLCALC 111 (H) 09/29/2016     Lab Results   Component Value Date    TRIG 189 (H) 05/17/2021    TRIG 214 (H) 07/05/2019    TRIG 183 (H) 09/29/2016     No results found for: CHOLHDL      ASSESSMENT AND PLAN         Snoring  Snoring has improved since has lost 12 pounds over the last 12  weeks.  We discussed the cyclical relationship between sleep apnea and excess body weight.  Patient is aware that excess body weight often contributes to sleep apnea, and that a 10% body weight decrease can result in 30% less apneic episodes. Untreated sleep apnea and its resulting sleep deprived state can be extremely physically stressful and can lead to hypertension, atrial fibrillation, and mood disorders. This stress can also worsen insulin resistance and make weight loss more difficult.  It is very important to sleep well, and I reinforced the need to work toward compliance with outlined medical treatments.   See plan for obesity    Other hyperlipidemia  Recheck pending next week.Education today on insulin resistance.      Continue to focus on food type timing and teamwork.  Encouraged adequate protein intake to reduce hunger and balance blood sugar.  Encouraged adequate cardiovascular activity and increased nonexercise activity daily.  Continue to balance stress with relaxation techniques and aim for adequate sleep nightly.  See plan for obesity      Depression  Stress is manageable.  Is being mindful.  Provided support and options.  Continue self-care time and mindfulness practices daily.  See plan for obesity.    BMI 37.0-37.9, adult  Continue current plan and close follow-up with team here at [facility].  Ensure adequate protein, fiber and 64 ounces of fluids per day.  Ensure dedicated exercise most days of the week.  Continue current meds.  Naltrexone refill.  To take as below.  Follow-up in 1-3 months or sooner as needed.  Naltrexone is generally well-tolerated.  Needs to be taken as a half tab once daily, generally at lunchtime.  You must avoid alcohol, opiates and excessive sugar 4-5 days prior to initiating the dose or you can feel symptoms of withdrawal such as nausea, diarrhea, headache, sweats.  Most common side effects of naltrexone include fatigue, mild headache, mild nausea.  It should not be used in  someone with history of liver damage.      Naltrexone is generally well-tolerated.  Needs to be taken as a half tab once daily, generally at lunchtime.  You must avoid alcohol, opiates and excessive sugar 4-5 days prior to initiating the dose or you can feel symptoms of withdrawal such as nausea, diarrhea, headache, sweats.  Most common side effects of naltrexone include fatigue, mild headache, mild nausea.  It should not be used in someone with history of liver damage.    Roseann Bird MD  9/1/2022     As part of this visit, on the day of service, I spent 10 minutes reviewing the pt's chart for all data/events relevant to this visit, composing this note, and conducting other visit follow up activities.    I spent 35 minutes directly evaluating and/or counseling and communicating with the patient.    The total duration of time spent on the day of service for this encounter was 40 minutes.     Over half of today's 40 minute visit was spent in education and counseling regarding  Nutrition, physical activity, metabolism, and weight loss goals and benefits.     Over 50% of this time was spent providing clinical counseling: y.  Level of medical decision making: medium     Thank you very much for allowing us to participate in the care of your patient. Please do not hesitate to call or email if there are any questions.    This document was created using Dragon dictation software.  There might be some typographical errors due to this technology.

## 2022-09-01 NOTE — ASSESSMENT & PLAN NOTE
Stress is manageable.  Is being mindful.  Provided support and options.  Continue self-care time and mindfulness practices daily.  See plan for obesity.

## 2022-09-01 NOTE — ASSESSMENT & PLAN NOTE
Snoring has improved since has lost 12 pounds over the last 12 weeks.  We discussed the cyclical relationship between sleep apnea and excess body weight.  Patient is aware that excess body weight often contributes to sleep apnea, and that a 10% body weight decrease can result in 30% less apneic episodes. Untreated sleep apnea and its resulting sleep deprived state can be extremely physically stressful and can lead to hypertension, atrial fibrillation, and mood disorders. This stress can also worsen insulin resistance and make weight loss more difficult.  It is very important to sleep well, and I reinforced the need to work toward compliance with outlined medical treatments.   See plan for obesity

## 2022-09-01 NOTE — ASSESSMENT & PLAN NOTE
Recheck pending next week.Education today on insulin resistance.      Continue to focus on food type timing and teamwork.  Encouraged adequate protein intake to reduce hunger and balance blood sugar.  Encouraged adequate cardiovascular activity and increased nonexercise activity daily.  Continue to balance stress with relaxation techniques and aim for adequate sleep nightly.  See plan for obesity

## 2022-09-01 NOTE — ASSESSMENT & PLAN NOTE
Continue current plan and close follow-up with team here at [facility].  Ensure adequate protein, fiber and 64 ounces of fluids per day.  Ensure dedicated exercise most days of the week.  Continue current meds.  Naltrexone refill.  To take as below.  Follow-up in 1-3 months or sooner as needed.  Naltrexone is generally well-tolerated.  Needs to be taken as a half tab once daily, generally at lunchtime.  You must avoid alcohol, opiates and excessive sugar 4-5 days prior to initiating the dose or you can feel symptoms of withdrawal such as nausea, diarrhea, headache, sweats.  Most common side effects of naltrexone include fatigue, mild headache, mild nausea.  It should not be used in someone with history of liver damage.

## 2022-09-01 NOTE — PATIENT INSTRUCTIONS
Continue current plan and close follow-up with team here at [facility].  Ensure adequate protein, fiber and 64 ounces of fluids per day.  Ensure dedicated exercise most days of the week.  Continue current meds.  Naltrexone refill.  To take as below.  Follow-up in 1-3 months or sooner as needed.  Naltrexone is generally well-tolerated.  Needs to be taken as a half tab once daily, generally at lunchtime.  You must avoid alcohol, opiates and excessive sugar 4-5 days prior to initiating the dose or you can feel symptoms of withdrawal such as nausea, diarrhea, headache, sweats.  Most common side effects of naltrexone include fatigue, mild headache, mild nausea.  It should not be used in someone with history of liver damage.

## 2022-09-07 LAB
ALBUMIN SERPL-MCNC: 4 G/DL (ref 3.6–5.1)
ALBUMIN/GLOB SERPL: 1.6 (CALC) (ref 1–2.5)
ALP SERPL-CCNC: 67 U/L (ref 31–125)
ALT SERPL-CCNC: 10 U/L (ref 6–29)
AST SERPL-CCNC: 12 U/L (ref 10–35)
BILIRUB SERPL-MCNC: 0.5 MG/DL (ref 0.2–1.2)
BUN SERPL-MCNC: 18 MG/DL (ref 7–25)
BUN/CREAT SERPL: NORMAL (CALC) (ref 6–22)
CALCIUM SERPL-MCNC: 9.1 MG/DL (ref 8.6–10.2)
CHLORIDE SERPL-SCNC: 105 MMOL/L (ref 98–110)
CHOLEST SERPL-MCNC: 191 MG/DL
CHOLEST/HDLC SERPL: 3 (CALC)
CO2 SERPL-SCNC: 25 MMOL/L (ref 20–32)
CREAT SERPL-MCNC: 0.83 MG/DL (ref 0.5–0.99)
EGFRCR SERPLBLD CKD-EPI 2021: 87 ML/MIN/1.73M2
ERYTHROCYTE [DISTWIDTH] IN BLOOD BY AUTOMATED COUNT: 12.3 % (ref 11–15)
GLOBULIN SER CALC-MCNC: 2.5 G/DL (CALC) (ref 1.9–3.7)
GLUCOSE SERPL-MCNC: 93 MG/DL (ref 65–99)
HCT VFR BLD AUTO: 39.2 % (ref 35–45)
HDLC SERPL-MCNC: 63 MG/DL
HGB BLD-MCNC: 13.2 G/DL (ref 11.7–15.5)
LDLC SERPL CALC-MCNC: 102 MG/DL (CALC)
MCH RBC QN AUTO: 31 PG (ref 27–33)
MCHC RBC AUTO-ENTMCNC: 33.7 G/DL (ref 32–36)
MCV RBC AUTO: 92 FL (ref 80–100)
NONHDLC SERPL-MCNC: 128 MG/DL (CALC)
PLATELET # BLD AUTO: 221 THOUSAND/UL (ref 140–400)
PMV BLD REES-ECKER: 12 FL (ref 7.5–12.5)
POTASSIUM SERPL-SCNC: 4.1 MMOL/L (ref 3.5–5.3)
PROT SERPL-MCNC: 6.5 G/DL (ref 6.1–8.1)
RBC # BLD AUTO: 4.26 MILLION/UL (ref 3.8–5.1)
SODIUM SERPL-SCNC: 139 MMOL/L (ref 135–146)
TRIGL SERPL-MCNC: 164 MG/DL
TSH SERPL-ACNC: 3.91 MIU/L
WBC # BLD AUTO: 7.5 THOUSAND/UL (ref 3.8–10.8)

## 2022-09-09 NOTE — PROCEDURE: MEDICATION COUNSELING
Medication: amLODIPine (NORVASC) 10 MG tablet  Last refill: 6/13/22 #NA; NA refills  Ordering provider: Outside Provider  Last office visit: 5/24/21 with provider: Lucia Lee MD      Writer is unable to refill medication per failed protocol. Will route to provider for further review.   Niacinamide Counseling: I recommended taking niacin or niacinamide, also know as vitamin B3, twice daily. Recent evidence suggests that taking vitamin B3 (500 mg twice daily) can reduce the risk of actinic keratoses and non-melanoma skin cancers. Side effects of vitamin B3 include flushing and headache.

## 2022-09-28 ENCOUNTER — TELEMEDICINE (OUTPATIENT)
Dept: BARIATRICS/WEIGHT MGMT | Facility: CLINIC | Age: 47
End: 2022-09-28
Payer: COMMERCIAL

## 2022-09-28 VITALS — BODY MASS INDEX: 36.49 KG/M2 | WEIGHT: 233 LBS

## 2022-09-28 DIAGNOSIS — F32.5 MAJOR DEPRESSIVE DISORDER IN FULL REMISSION, UNSPECIFIED WHETHER RECURRENT (CMS/HCC): ICD-10-CM

## 2022-09-28 DIAGNOSIS — E78.5 HYPERLIPIDEMIA, UNSPECIFIED HYPERLIPIDEMIA TYPE: Primary | ICD-10-CM

## 2022-09-28 DIAGNOSIS — R06.83 SNORING: ICD-10-CM

## 2022-09-28 PROCEDURE — 3008F BODY MASS INDEX DOCD: CPT | Performed by: FAMILY MEDICINE

## 2022-09-28 PROCEDURE — 99214 OFFICE O/P EST MOD 30 MIN: CPT | Mod: 95 | Performed by: FAMILY MEDICINE

## 2022-09-28 RX ORDER — PEN NEEDLE, DIABETIC 30 GX3/16"
NEEDLE, DISPOSABLE MISCELLANEOUS
Qty: 90 EACH | Refills: 0 | Status: SHIPPED | OUTPATIENT
Start: 2022-09-28 | End: 2022-12-07

## 2022-09-28 RX ORDER — LIRAGLUTIDE 6 MG/ML
INJECTION, SOLUTION SUBCUTANEOUS
Qty: 3 ML | Refills: 3 | Status: SHIPPED | OUTPATIENT
Start: 2022-09-28 | End: 2022-12-07

## 2022-09-28 NOTE — PATIENT INSTRUCTIONS
Continue current plan and close follow-up with team here at [facility].  Ensure adequate protein, fiber and 64 ounces of fluids per day.  Ensure dedicated exercise most days of the week.  Start Saxenda as below. Wean off naltrexone.  Follow-up in 3 months or sooner as needed.  Saxenda titration: please do not increase your daily dose any faster than 1 time per week. If you have more than mild nausea, you can continue on the same does for more than 1 week if needed.     Week 1: 0.6 mg per day  Week 2: 1.2 mg per day  Week 3: 1.8 mg per day  Week 4: 2.4 mg per day  Week 5: 3.0 mg per day.     We will recheck a lab test for kidney function after you are on the medication for 6 weeks.

## 2022-09-28 NOTE — ASSESSMENT & PLAN NOTE
Continue current plan and close follow-up with team here at [facility].  Ensure adequate protein, fiber and 64 ounces of fluids per day.  Ensure dedicated exercise most days of the week.  Start Saxenda as below. Wean off naltrexone.  Follow-up in 3 months or sooner as needed.

## 2022-09-28 NOTE — Clinical Note
Katie Encarnacion, Hope you had a great start to Autumn! I had the pleasure of following up with Ms. Norwood today virtually in my [facility] clinic. She is very motivated to get to a better health.  She has lost 12# and inches over 12 weeks but then plateaued. We decided to add Saxenda/GLP-1 agonist to help with her healthy lifestyle modification. She is going to follow-up with the team here at [facility] including the dietitian and exercise physiologist to keep her accountable. I appreciate you letting us be a part of her weight loss journey. Please let me know if you have any questions or concerns. Best DT

## 2022-09-28 NOTE — ASSESSMENT & PLAN NOTE
Reviewed recent labs done on September 7, 2022.  Total cholesterol in normal range.  LDL borderline at 102.  Overall labs are improving compared to 2 years ago.  Continue healthy lifestyle modification.  See plan for obesity

## 2022-09-28 NOTE — ASSESSMENT & PLAN NOTE
The problem of recurrent insomnia is discussed. Avoidance of caffeine sources is strongly encouraged. Sleep hygiene issues are reviewed.  See plan for obesity

## 2022-09-28 NOTE — PROGRESS NOTES
DETAILS OF VISIT   Start of visit: 9:00 am   End of visit: 9:44 am  Total time: 44 minutes on this date of service performing the following activities: obtaining history, entering orders, documenting, preparing for visit, obtaining / reviewing records, providing counseling and education, independently reviewing study/studies, communicating results and coordinating care    State:[place]    Telemedicine visit: Audio and Video Encounter   Hello, my name is Roseann Bird MD.  Before we proceed, can you please verify your identification by telling me your full name and date of birth?  Can you tell me who is in the room with you?    You and I are about to have a telemedicine check-in or visit because you have requested it.  This is a live video-conference.  I am a real person, speaking to you in real time.  There is no one else with me on the video-conference.  However, when we use ([employer], [employer], etc) it is important for you to know that the video-conference may not be secure or private.  I am not recording this conversation and I am asking you not to record it.  This telemedicine visit will be billed to your health insurance or you, if you are self-insured.  You understand you will be responsible for any copayments or coinsurances that apply to your telemedicine visit.  Communication platform used for this encounter:  [employer] Video Visit (no Zoom)     Before starting our telemedicine visit, I am required to get your consent for this virtual check-in or visit by telemedicine. Do you consent?    Consent statement Read: You and I are about to have a Telemedicine visit.  This is allowed because you are already my patient, and you have requested it.  This telemedicine visit will be billed to your health insurance or to you, if you are self-insured. You understand that you ill be responsible for any co-payments or co-insurances that apply to your telemedicine visit.  Before starting our telemedicine visit, I am  required to get your consent for this virtual check-in.  Do you consent?  Patient reply : Yes    PCP: Mignon Encarnacion, DO   HISTORY OF PRESENT ILLNESS        Barbra Norwood is a 47 y.o. female following up on lifestyle management and weight loss as adjunctive treatment strategies for Metabolic syndrome, Dyslipidemia, joint pain and Depression     Last Visit recap:  Last seen September 1, 2022   had lost 12 pounds over 12 weeks and has plateaued since   On September 7 -did get her labs done-in anticipation of getting started on Saxenda or Ozempic.    Medications used to support lifestyle modifications:Naltrexone  Trying 25 mg of naltrexone at 7am-no side effects but does not feel that it is helping with her sugar cravings.    Interval history:   Chart review done since last visit.    Reviewed most recent labs, Reviewed last Registered Dietitian's notes, Reviewed Medical Doctor's note and Reviewed PCP/Specialists' recent notes    Concerns or questions:   Med options-wishes to try GLP-1 agonist.    Not planning pregnancy, no personal history of Pancreatitis, MEN type II cancer  Lab results-reviewed today  Total cholesterol, improving compared to last labs 2 years ago.    Rest of the labs within normal limits including TSH, glucose, creatinine, kidney function and liver function.    Nutrition:  Seeing Katie on 10.6.22    Not logging  - trying to increase protein in her day  Notices does not get enough especially during lunch.  Craves for sugary treats at 3 pm and after dinner.  Successes:  Drinking 64 oz or more of water daily and Keeping to a fasting schedule of 12 or more hours daily    Challenges:  Trouble hitting protein goals, Cravings, After dinner eating, Spontaneous snacking, trouble with portion control and Cravings for cookies at 3pm and after dinner      Physical activity:  Exercise is not yet consistent and physical activity is decreased since son's school started    Sleep:   Sleeping 8hr hours a night on  average, Sleep is restful and snores   Falls asleep easily.  No sleep study in the past.      Stress:   moderate, Contributing factors include work,life, Practicing mindfulness most days of the week and Dedicated relaxation time 10 minutes per day   [place] 5-10 min  Walk in nature  Tik tok      PAST MEDICAL History        Past Medical History:   Diagnosis Date    Arthritis     Depression        MEDICATIONS        Current Outpatient Medications on File Prior to Visit   Medication Sig Dispense Refill    ascorbic acid (VITAMIN C) 500 mg tablet Take 500 mg by mouth daily.      biotin 300 mcg tablet Take by mouth daily.      cholecalciferol, vitamin D3, 1,000 unit (25 mcg) tablet Take 1,000 Units by mouth daily.      citalopram (celeXA) 40 mg tablet TAKE 1 TABLET BY MOUTH EVERY DAY 90 tablet 0    desog-e.estradioL/e.estradioL (KARIVA) 0.15-0.02 mgx21 /0.01 mg x 5 per tablet take 1 tablet by oral route  every day      FISH OIL-omega-3 fatty acids (FISH OIL) 340-1,000 mg capsule Take by mouth 3 (three) times a day.       No current facility-administered medications on file prior to visit.       ALLERGIES        Bactrim [sulfamethoxazole-trimethoprim] and Penicillins           REVIEW OF SYSTEMS      Review of Systems    Review of Systems   All other systems reviewed and are negative.       PHYSICAL EXAMINATION      Visit Vitals  Wt 106 kg (233 lb)   BMI 36.49 kg/m²      Body mass index is 36.49 kg/m².    BP Readings from Last 3 Encounters:   09/01/22 123/79   05/31/22 128/74   04/25/22 120/84     Wt Readings from Last 3 Encounters:   09/28/22 106 kg (233 lb)   09/01/22 105 kg (232 lb 4.8 oz)   08/02/22 104 kg (230 lb)       Physical Exam  Vitals and nursing note reviewed.   Constitutional:       Appearance: She is well-developed. She is obese.   HENT:      Head: Atraumatic.      Right Ear: External ear normal.      Left Ear: External ear normal.      Nose: Nose normal.      Mouth/Throat:      Mouth: Mucous membranes  are moist.      Comments: Mallampati score 2+  Eyes:      Conjunctiva/sclera: Conjunctivae normal.   Cardiovascular:      Rate and Rhythm: Normal rate.   Pulmonary:      Effort: Pulmonary effort is normal.   Abdominal:      General: Bowel sounds are normal.   Musculoskeletal:         General: Normal range of motion.      Cervical back: Normal range of motion.   Skin:     General: Skin is warm and dry.   Neurological:      General: No focal deficit present.      Mental Status: She is alert and oriented to person, place, and time.   Psychiatric:         Mood and Affect: Mood normal.         Behavior: Behavior normal.         Thought Content: Thought content normal.         Judgment: Judgment normal.           LABS / IMAGING / EKG        Reviewed the following Labs:    Lab Results   Component Value Date    HGBA1C 5.4 07/05/2019    HGBA1C 5.6 09/29/2016     Lab Results   Component Value Date    CHOL 191 09/07/2022    CHOL 218 (H) 05/17/2021    CHOL 240 (H) 07/05/2019     Lab Results   Component Value Date    HDL 63 09/07/2022    HDL 65 05/17/2021    HDL 56 07/05/2019     Lab Results   Component Value Date    LDLCALC 102 (H) 09/07/2022    LDLCALC 123 (H) 05/17/2021    LDLCALC 148 (H) 07/05/2019     Lab Results   Component Value Date    TRIG 164 (H) 09/07/2022    TRIG 189 (H) 05/17/2021    TRIG 214 (H) 07/05/2019     No results found for: CHOLHDL      ASSESSMENT AND PLAN         BMI 36.0-36.9,adult  Continue current plan and close follow-up with team here at [facility].  Ensure adequate protein, fiber and 64 ounces of fluids per day.  Ensure dedicated exercise most days of the week.  Start Saxenda as below. Wean off naltrexone.  Follow-up in 3 months or sooner as needed.    Snoring  The problem of recurrent insomnia is discussed. Avoidance of caffeine sources is strongly encouraged. Sleep hygiene issues are reviewed.  See plan for obesity    Depression  Mood is stable on Celexa.  Continue mindfulness practices daily.  See plan  for obesity.    Other hyperlipidemia  Reviewed recent labs done on September 7, 2022.  Total cholesterol in normal range.  LDL borderline at 102.  Overall labs are improving compared to 2 years ago.  Continue healthy lifestyle modification.  See plan for obesity        Saxenda titration: please do not increase your daily dose any faster than 1 time per week. If you have more than mild nausea, you can continue on the same does for more than 1 week if needed.     Week 1: 0.6 mg per day  Week 2: 1.2 mg per day  Week 3: 1.8 mg per day  Week 4: 2.4 mg per day  Week 5: 3.0 mg per day.     We will recheck a lab test for kidney function after you are on the medication for 6 weeks.         Thank you very much for allowing us to participate in the care of your patient. Please do not hesitate to call or email if there are any questions.

## 2022-10-19 ENCOUNTER — OFFICE VISIT (OUTPATIENT)
Dept: PRIMARY CARE | Facility: CLINIC | Age: 47
End: 2022-10-19
Payer: COMMERCIAL

## 2022-10-19 ENCOUNTER — HOSPITAL ENCOUNTER (OUTPATIENT)
Dept: RADIOLOGY | Facility: CLINIC | Age: 47
Discharge: HOME | End: 2022-10-19
Attending: FAMILY MEDICINE
Payer: COMMERCIAL

## 2022-10-19 VITALS
OXYGEN SATURATION: 99 % | SYSTOLIC BLOOD PRESSURE: 110 MMHG | RESPIRATION RATE: 16 BRPM | TEMPERATURE: 97.3 F | HEIGHT: 67 IN | BODY MASS INDEX: 36.57 KG/M2 | HEART RATE: 74 BPM | WEIGHT: 233 LBS | DIASTOLIC BLOOD PRESSURE: 70 MMHG

## 2022-10-19 DIAGNOSIS — Z12.31 ENCOUNTER FOR SCREENING MAMMOGRAM FOR MALIGNANT NEOPLASM OF BREAST: ICD-10-CM

## 2022-10-19 DIAGNOSIS — M79.672 LEFT FOOT PAIN: ICD-10-CM

## 2022-10-19 DIAGNOSIS — Z00.00 ROUTINE MEDICAL EXAM: Primary | ICD-10-CM

## 2022-10-19 DIAGNOSIS — F33.42 RECURRENT MAJOR DEPRESSIVE DISORDER, IN FULL REMISSION (CMS/HCC): ICD-10-CM

## 2022-10-19 DIAGNOSIS — F32.5 MAJOR DEPRESSIVE DISORDER IN FULL REMISSION, UNSPECIFIED WHETHER RECURRENT (CMS/HCC): ICD-10-CM

## 2022-10-19 PROBLEM — R00.2 PALPITATIONS: Status: RESOLVED | Noted: 2021-09-02 | Resolved: 2022-10-19

## 2022-10-19 PROBLEM — Z71.3 DIETARY COUNSELING: Status: RESOLVED | Noted: 2022-06-14 | Resolved: 2022-10-19

## 2022-10-19 PROCEDURE — 3008F BODY MASS INDEX DOCD: CPT | Performed by: FAMILY MEDICINE

## 2022-10-19 PROCEDURE — 73630 X-RAY EXAM OF FOOT: CPT | Mod: LT

## 2022-10-19 PROCEDURE — 99396 PREV VISIT EST AGE 40-64: CPT | Performed by: FAMILY MEDICINE

## 2022-10-19 RX ORDER — CITALOPRAM 40 MG/1
40 TABLET, FILM COATED ORAL
Qty: 90 TABLET | Refills: 1 | Status: SHIPPED | OUTPATIENT
Start: 2022-10-19 | End: 2023-04-25

## 2022-10-19 ASSESSMENT — ENCOUNTER SYMPTOMS
BLOOD IN STOOL: 0
HEADACHES: 0
FATIGUE: 0
RHINORRHEA: 0
ARTHRALGIAS: 0
PALPITATIONS: 0
NERVOUS/ANXIOUS: 0
DYSURIA: 0
MYALGIAS: 0
CONSTIPATION: 0
FEVER: 0
ABDOMINAL PAIN: 0
SHORTNESS OF BREATH: 0
COUGH: 0
SLEEP DISTURBANCE: 0
WHEEZING: 0
CHILLS: 0
FREQUENCY: 0
SORE THROAT: 0
DIZZINESS: 0
HEMATURIA: 0

## 2022-10-19 ASSESSMENT — PATIENT HEALTH QUESTIONNAIRE - PHQ9: SUM OF ALL RESPONSES TO PHQ9 QUESTIONS 1 & 2: 0

## 2022-10-19 NOTE — PROGRESS NOTES
Subjective      Patient ID: Barbra Norwood is a 47 y.o. female.    She is here for physical. She is doing ok overall. She is trying to follow well balanced meals but since school started with her son going to school she is not able to walk in the morning like she was but looking into joining a gym. She has lost 12lbs since last visit. Stays well hydrated. Average 7hrs of sleep a night. Average 7-8hrs of sleep a night. Doesn't get periods due to the birth control. Normal BMs and urination. She will do cologuard. She does need her 8 oz coffee in the am otherwise will get a migraine. UTD with eye and dental exam. She did fall in the shower this am and tripped and banged her L foot against shower door and landed on her butt. She is ok now but L foot hurts when she walks and painful with dorsiflexing foot. +mild swelling      The following have been reviewed and updated as appropriate in this visit:   Tobacco  Allergies  Meds  Problems  Med Hx  Surg Hx  Fam Hx       Review of Systems   Constitutional: Negative for chills, fatigue and fever.   HENT: Negative for ear discharge, ear pain, postnasal drip, rhinorrhea and sore throat.    Respiratory: Negative for cough, shortness of breath and wheezing.    Cardiovascular: Negative for chest pain and palpitations.   Gastrointestinal: Negative for abdominal pain, blood in stool and constipation.   Genitourinary: Negative for dysuria, frequency and hematuria.   Musculoskeletal: Negative for arthralgias and myalgias.        L foot pain   Skin: Negative for rash.   Neurological: Negative for dizziness and headaches.   Psychiatric/Behavioral: Negative for sleep disturbance and suicidal ideas. The patient is not nervous/anxious.        Current Outpatient Medications   Medication Sig Dispense Refill    ascorbic acid (VITAMIN C) 500 mg tablet Take 500 mg by mouth daily.      biotin 300 mcg tablet Take by mouth daily.      cholecalciferol, vitamin D3, 1,000 unit (25 mcg)  "tablet Take 1,000 Units by mouth daily.      citalopram (celeXA) 40 mg tablet Take 1 tablet (40 mg total) by mouth once daily. 90 tablet 1    desog-e.estradioL/e.estradioL (KARIVA) 0.15-0.02 mgx21 /0.01 mg x 5 per tablet take 1 tablet by oral route  every day      FISH OIL-omega-3 fatty acids (FISH OIL) 340-1,000 mg capsule Take 2 capsules by mouth daily.      naltrexone HCl (NALTREXONE ORAL) Take by mouth.      psyllium husk (METAMUCIL ORAL) Take by mouth.      liraglutide, weight loss, (SAXENDA) 3 mg/0.5 mL (18 mg/3 mL) pen injector Saxenda titration: Week 1: 0.6 mg/ day week 2: 1.2 mg/ day week 3:1.8 mg/ day week 4: 2.4 mg/ day week 5: 3.0 mg/ day. If you have more than mild nausea, you can continue on the same does for more than 1 week (Patient not taking: Reported on 10/19/2022) 3 mL 3    pen needle, diabetic (PEN NEEDLE) 32 gauge x /32\" needle Use with Saxenda injection daily. 90 each 0     No current facility-administered medications for this visit.     Past Medical History:   Diagnosis Date    Arthritis     Depression      Family History   Problem Relation Age of Onset    Breast cancer Biological Mother     Arthritis Biological Mother     Arthritis Maternal Grandmother     Arthritis Maternal Grandfather     Breast cancer Mother's Sister     Autism Biological Child     Pulmonary embolism Biological Father      Past Surgical History:   Procedure Laterality Date     SECTION      2x    CYST REMOVAL  2019    TONSILLECTOMY       Social History     Socioeconomic History    Marital status:      Spouse name: Not on file    Number of children: 2    Years of education: Not on file    Highest education level: Not on file   Occupational History    Occupation: Health    Tobacco Use    Smoking status: Never    Smokeless tobacco: Never   Vaping Use    Vaping Use: Never used   Substance and Sexual Activity    Alcohol use: Yes     Comment: rarely    Drug use: " "No    Sexual activity: Yes     Partners: Male   Other Topics Concern    Not on file   Social History Narrative    Not on file     Social Determinants of Health     Financial Resource Strain: Not on file   Food Insecurity: Not on file   Transportation Needs: Not on file   Physical Activity: Not on file   Stress: Not on file   Social Connections: Not on file   Intimate Partner Violence: Not on file   Housing Stability: Not on file     Allergies   Allergen Reactions    Bactrim [Sulfamethoxazole-Trimethoprim] Hives    Penicillins Angioedema       Objective   Visit Vitals  /70 (BP Location: Left upper arm, Patient Position: Sitting)   Pulse 74   Temp 36.3 °C (97.3 °F) (Temporal)   Resp 16   Ht 1.702 m (5' 7\")   Wt 106 kg (233 lb) Comment: without shoes   LMP  (LMP Unknown)   SpO2 99%   BMI 36.49 kg/m²     Physical Exam  Vitals and nursing note reviewed.   Constitutional:       Appearance: Normal appearance.   HENT:      Right Ear: Tympanic membrane normal. There is no impacted cerumen.      Left Ear: Tympanic membrane normal. There is no impacted cerumen.      Nose: Nose normal. No rhinorrhea.      Mouth/Throat:      Mouth: Mucous membranes are moist.      Pharynx: Oropharynx is clear. No posterior oropharyngeal erythema.   Cardiovascular:      Rate and Rhythm: Normal rate and regular rhythm.      Heart sounds: No murmur heard.  Pulmonary:      Effort: Pulmonary effort is normal.      Breath sounds: Normal breath sounds. No wheezing.   Abdominal:      General: Bowel sounds are normal.      Palpations: Abdomen is soft.      Tenderness: There is no abdominal tenderness.   Musculoskeletal:         General: Normal range of motion.      Cervical back: Normal range of motion and neck supple.      Comments: L foot- mild swelling noted at great toe region, able to walk but painful with dorsiflexon, pulse intact, able to wiggle toes, TTP along great toe and top of foot    Skin:     General: Skin is warm.      Findings: " No rash.   Neurological:      General: No focal deficit present.      Mental Status: She is alert and oriented to person, place, and time.      Cranial Nerves: No cranial nerve deficit.   Psychiatric:         Mood and Affect: Mood normal.         Behavior: Behavior normal.         Assessment/Plan   Problem List Items Addressed This Visit        Mental Health    Depression     Doing well on celexa         Relevant Medications    citalopram (celeXA) 40 mg tablet   Other Visit Diagnoses     Routine medical exam    -  Primary    Encounter for screening mammogram for malignant neoplasm of breast        Relevant Orders    BI SCREENING MAMMOGRAM BILATERAL(TOMOSYNTHESIS)    Left foot pain        Relevant Orders    X-RAY FOOT LEFT 3+ VIEWS      She is doing pretty good overall. Discussed diet and exercise. She will cont with weight management. UTD with eye and dental exam. UTD with gyn care. Will check mammo and do cologuard for now. UTD with fasting labs which are stable. Will cont current meds. She will also get xray of foot and elevate, ice and advil and keep me updated if pain persists.     Mignon Encarnacion, DO  10/19/2022

## 2022-10-25 ENCOUNTER — OFFICE VISIT (OUTPATIENT)
Dept: PRIMARY CARE | Facility: CLINIC | Age: 47
End: 2022-10-25
Payer: COMMERCIAL
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
## 2022-10-25 PROCEDURE — 99214 OFFICE O/P EST MOD 30 MIN: CPT

## 2022-10-25 RX ORDER — DOXYCYCLINE HYCLATE 100 MG
100 TABLET ORAL 2 TIMES DAILY
Qty: 10 TABLET | Refills: 0 | Status: SHIPPED | OUTPATIENT
Start: 2022-10-25 | End: 2022-10-30

## 2022-10-25 RX ORDER — METRONIDAZOLE 500 MG/1
500 TABLET ORAL 3 TIMES DAILY
Qty: 15 TABLET | Refills: 0 | Status: SHIPPED | OUTPATIENT
Start: 2022-10-25 | End: 2022-10-30

## 2022-10-25 ASSESSMENT — ENCOUNTER SYMPTOMS
FATIGUE: 0
FEVER: 0
CHILLS: 0

## 2022-10-25 NOTE — PATIENT INSTRUCTIONS
-Rx Doxycycline twice a day for 5 days  -Rx Metronidazole three times a day for 5 days  -Send updated photo in 3 days     Antibiotics prescribed - risks and side effects discussed  Probiotics recommended  Supportive care reviewed  Over the counter medications discussed  Counseled on medical condition  Warning signs reviewed in detail  Return to office for non-resolution    Doxycycline:   -Esophagitis is common with doxycycline, do not take at bedtime or prior to lying down and take with lots of water to prevent this  -Skin will be sensitive to sun light     Flagyl (Metronidazole):  -This medication can cause nausea and vomiting if combined with alcohol. Please obstain from alcohol while taking this medication.

## 2022-10-25 NOTE — ASSESSMENT & PLAN NOTE
-Rx Doxycycline twice a day for 5 days  -Rx Metronidazole three times a day for 5 days  -Patient to send updated photo in 3 days

## 2022-10-25 NOTE — PROGRESS NOTES
ESTABLISHED PATIENT OFFICE VISIT    ROSETTA GOLDSTEIN  [facility]  [address] [place] [address]  [address] [place] [address]  [phone]      HISTORY OF PRESENT ILLNESS        HPI:  Barbra Norwood is a 47 y.o. female presents today for   Chief Complaint   Patient presents with    Animal Bite     Dog bite:   Two of the patients dogs got into a fight last night and when patient was  the dogs the patient got bitten  Dogs are up to date with vaccines, no concerns for rabies  Patients last tetanus was 21  Patient with allergies to sulfa and penicillin   Laceration cleansed with hydrogen peroxide diluted with normal saline and iodine, triple antibiotic ointment and band aide applied   Patient to leave laceration open to air while at home and to cover with band aid when going out   Patient to monitor signs/symptoms of infection, patient to send updated photo in 3 days           PAST MEDICAL AND SURGICAL HISTORY      Past Medical History:   Diagnosis Date    Arthritis     Depression        Past Surgical History:   Procedure Laterality Date     SECTION      2x    CYST REMOVAL  2019    TONSILLECTOMY       MEDICATIONS        Current Outpatient Medications:     doxycycline hyclate (VIBRA-TABS) 100 mg tablet, Take 1 tablet (100 mg total) by mouth 2 (two) times a day for 5 days., Disp: 10 tablet, Rfl: 0    metroNIDAZOLE (FLAGYL) 500 mg tablet, Take 1 tablet (500 mg total) by mouth 3 (three) times a day for 5 days., Disp: 15 tablet, Rfl: 0    ascorbic acid (VITAMIN C) 500 mg tablet, Take 500 mg by mouth daily., Disp: , Rfl:     biotin 300 mcg tablet, Take by mouth daily., Disp: , Rfl:     cholecalciferol, vitamin D3, 1,000 unit (25 mcg) tablet, Take 1,000 Units by mouth daily., Disp: , Rfl:     citalopram (celeXA) 40 mg tablet, Take 1 tablet (40 mg total) by mouth once daily., Disp: 90 tablet, Rfl: 1    desog-e.estradioL/e.estradioL (KARIVA) 0.15-0.02 mgx21 /0.01 mg x 5 per  "tablet, take 1 tablet by oral route  every day, Disp: , Rfl:     FISH OIL-omega-3 fatty acids (FISH OIL) 340-1,000 mg capsule, Take 2 capsules by mouth daily., Disp: , Rfl:     liraglutide, weight loss, (SAXENDA) 3 mg/0.5 mL (18 mg/3 mL) pen injector, Saxenda titration: Week 1: 0.6 mg/ day week 2: 1.2 mg/ day week 3:1.8 mg/ day week 4: 2.4 mg/ day week 5: 3.0 mg/ day. If you have more than mild nausea, you can continue on the same does for more than 1 week (Patient not taking: Reported on 10/19/2022), Disp: 3 mL, Rfl: 3    naltrexone HCl (NALTREXONE ORAL), Take by mouth., Disp: , Rfl:     pen needle, diabetic (PEN NEEDLE) 32 gauge x 5/32\" needle, Use with Saxenda injection daily., Disp: 90 each, Rfl: 0    psyllium husk (METAMUCIL ORAL), Take by mouth., Disp: , Rfl:     ALLERGIES      Bactrim [sulfamethoxazole-trimethoprim] and Penicillins    FAMILY HISTORY      Family History   Problem Relation Age of Onset    Breast cancer Biological Mother     Arthritis Biological Mother     Arthritis Maternal Grandmother     Arthritis Maternal Grandfather     Breast cancer Mother's Sister     Autism Biological Child     Pulmonary embolism Biological Father        SOCIAL/ TOBACCO HISTORY      Social History     Tobacco Use    Smoking status: Never    Smokeless tobacco: Never   Vaping Use    Vaping Use: Never used   Substance Use Topics    Alcohol use: Yes     Comment: rarely    Drug use: No       REVIEW OF SYSTEMS      Review of Systems   Constitutional: Negative for chills, fatigue and fever.        PHYSICAL EXAMINATION   There were no vitals filed for this visit.    Wt Readings from Last 3 Encounters:   10/19/22 106 kg (233 lb)   09/28/22 106 kg (233 lb)   09/01/22 105 kg (232 lb 4.8 oz)        Lab Results   Component Value Date    WBC 7.5 09/07/2022    HGB 13.2 09/07/2022    HCT 39.2 09/07/2022     09/07/2022    CHOL 191 09/07/2022    TRIG 164 (H) 09/07/2022    HDL 63 09/07/2022    ALT 10 09/07/2022    " AST 12 09/07/2022     09/07/2022    K 4.1 09/07/2022     09/07/2022    CREATININE 0.83 09/07/2022    BUN 18 09/07/2022    CO2 25 09/07/2022    TSH 3.91 09/07/2022    HGBA1C 5.4 07/05/2019    LDLCALC 102 (H) 09/07/2022       Physical Exam  Vitals reviewed.   Constitutional:       General: She is not in acute distress.     Appearance: Normal appearance. She is not ill-appearing, toxic-appearing or diaphoretic.   HENT:      Head: Normocephalic.   Musculoskeletal:         General: Normal range of motion.      Right hand: Normal.      Left hand: Swelling (Left middle finger), laceration (Left milddle finger) and tenderness present. Normal range of motion. Normal capillary refill.   Skin:     General: Skin is warm and dry.      Comments: Very mild erythema present around laceration. No induration or discharge noted   Neurological:      Mental Status: She is alert.   Psychiatric:         Mood and Affect: Mood normal.         Behavior: Behavior normal.         Thought Content: Thought content normal.         Judgment: Judgment normal.        ASSESSMENT AND PLAN   Assessment   Problem List Items Addressed This Visit        Other    Dog bite - Primary     -Rx Doxycycline twice a day for 5 days  -Rx Metronidazole three times a day for 5 days  -Patient to send updated photo in 3 days          Relevant Medications    doxycycline hyclate (VIBRA-TABS) 100 mg tablet    metroNIDAZOLE (FLAGYL) 500 mg tablet          I spent 25 minutes on this date of service performing the following activities: obtaining history, performing examination, entering orders, documenting, preparing for visit, obtaining / reviewing records and providing counseling and education.    ANGELITA Ross  10/25/2022    Patient Instructions   -Rx Doxycycline twice a day for 5 days  -Rx Metronidazole three times a day for 5 days  -Send updated photo in 3 days     Antibiotics prescribed - risks and side effects discussed  Probiotics  recommended  Supportive care reviewed  Over the counter medications discussed  Counseled on medical condition  Warning signs reviewed in detail  Return to office for non-resolution    Doxycycline:   -Esophagitis is common with doxycycline, do not take at bedtime or prior to lying down and take with lots of water to prevent this  -Skin will be sensitive to sun light     Flagyl (Metronidazole):  -This medication can cause nausea and vomiting if combined with alcohol. Please obstain from alcohol while taking this medication.

## 2022-11-17 LAB — COLOGUARD: NEGATIVE

## 2022-11-25 ENCOUNTER — TELEPHONE (OUTPATIENT)
Dept: PRIMARY CARE | Facility: CLINIC | Age: 47
End: 2022-11-25
Payer: COMMERCIAL

## 2022-12-07 ENCOUNTER — TELEMEDICINE (OUTPATIENT)
Dept: BARIATRICS/WEIGHT MGMT | Facility: CLINIC | Age: 47
End: 2022-12-07
Payer: COMMERCIAL

## 2022-12-07 VITALS — WEIGHT: 230 LBS | BODY MASS INDEX: 36.02 KG/M2

## 2022-12-07 DIAGNOSIS — E78.49 OTHER HYPERLIPIDEMIA: ICD-10-CM

## 2022-12-07 DIAGNOSIS — R06.83 SNORING: Primary | ICD-10-CM

## 2022-12-07 DIAGNOSIS — F32.5 MAJOR DEPRESSIVE DISORDER IN FULL REMISSION, UNSPECIFIED WHETHER RECURRENT (CMS/HCC): ICD-10-CM

## 2022-12-07 PROCEDURE — 99214 OFFICE O/P EST MOD 30 MIN: CPT | Mod: 95 | Performed by: FAMILY MEDICINE

## 2022-12-07 PROCEDURE — 3008F BODY MASS INDEX DOCD: CPT | Performed by: FAMILY MEDICINE

## 2022-12-07 RX ORDER — NALTREXONE HYDROCHLORIDE 50 MG/1
25 TABLET, FILM COATED ORAL DAILY
Qty: 45 TABLET | Refills: 0 | Status: SHIPPED | OUTPATIENT
Start: 2022-12-07 | End: 2023-03-07

## 2022-12-07 NOTE — ASSESSMENT & PLAN NOTE
Continue current plan and close follow-up with team here at [facility].  Ensure adequate protein, fiber and 64 ounces of fluids per day.  Ensure dedicated exercise most days of the week.  Continue current meds.Naltrexone refilled.  Follow-up in 3 months or sooner as needed.

## 2022-12-07 NOTE — PROGRESS NOTES
DETAILS OF VISIT   Start of visit: 7:50 am   End of visit: 8:30 am  Total time: 44 minutes on this date of service performing the following activities: obtaining history, entering orders, documenting, preparing for visit, obtaining / reviewing records, providing counseling and education, independently reviewing study/studies, communicating results and coordinating care    State:[place]    Telemedicine visit: Audio and Video Encounter   Hello, my name is Roseann Bird MD.  Before we proceed, can you please verify your identification by telling me your full name and date of birth?  Can you tell me who is in the room with you?    You and I are about to have a telemedicine check-in or visit because you have requested it.  This is a live video-conference.  I am a real person, speaking to you in real time.  There is no one else with me on the video-conference.  However, when we use ([facility], [employer], etc) it is important for you to know that the video-conference may not be secure or private.  I am not recording this conversation and I am asking you not to record it.  This telemedicine visit will be billed to your health insurance or you, if you are self-insured.  You understand you will be responsible for any copayments or coinsurances that apply to your telemedicine visit.  Communication platform used for this encounter:  [facility] Video Visit (no Zoom)     Before starting our telemedicine visit, I am required to get your consent for this virtual check-in or visit by telemedicine. Do you consent?    Consent statement Read: You and I are about to have a Telemedicine visit.  This is allowed because you are already my patient, and you have requested it.  This telemedicine visit will be billed to your health insurance or to you, if you are self-insured. You understand that you ill be responsible for any co-payments or co-insurances that apply to your telemedicine visit.  Before starting our telemedicine visit, I am  required to get your consent for this virtual check-in.  Do you consent?  Patient reply : Yes    PCP: Mignon Encarnacion, DO   HISTORY OF PRESENT ILLNESS        Barbra Norwood is a 47 y.o. 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     Interval history:   Chart review done since last visit.    Reviewed most recent labs, Reviewed last Registered Dietitian's notes, Reviewed Medical Doctor's note and Reviewed PCP/Specialists' recent notes  Reiterated [facility] policy- the need for close follow-up with the team here and have monthly touch points at [facility] for us to help continue to prescribe medication safely and help with the weight loss journey.    Concerns or questions:   Med options  Lab results-reviewed today- September 7,22  Total cholesterol, improving compared to last labs 2 years ago.    Rest of the labs within normal limits including TSH, glucose, creatinine, kidney function and liver function.    Nutrition:  Seen Katie on 8.2.22    Successes:  Meeting protein goals most days of the week, Keeping to a fasting schedule of 12 or more hours daily, Keeping to meal structure (no spontaneous snacking), Not struggling with hunger, Not struggling with food cravings and Limiting off plan items to 1 per week    Challenges:  Inadequate water intake, not recording intake and cookie cravings rare      Physical activity:  Seen Shannan on 4.25.22 6mwt  Exercise is not yet consistent and physical activity is decreased since son's school started  Looking for a new     Sleep:   Sleeping 8hr hours a night on  average, Sleep is restful and snores   Falls asleep easily.  No sleep study in the past.      Stress:   moderate, Contributing factors include work,life, Practicing mindfulness most days of the week and Dedicated relaxation time 10 minutes per day   [place] 5-10 min  Walk in nature  Tik tok      PAST MEDICAL History        Past Medical History:   Diagnosis Date   • Arthritis    • Depression        MEDICATIONS        Current Outpatient Medications on File Prior to Visit   Medication Sig Dispense Refill   • ascorbic acid (VITAMIN C) 500 mg tablet Take 500 mg by mouth daily.     • biotin 300 mcg tablet Take by mouth daily.     • cholecalciferol, vitamin D3, 1,000 unit (25 mcg) tablet Take 1,000 Units by mouth daily.     • citalopram (celeXA) 40 mg tablet Take 1 tablet (40 mg total) by mouth once daily. 90 tablet 1   • desog-e.estradioL/e.estradioL (KARIVA) 0.15-0.02 mgx21 /0.01 mg x 5 per tablet take 1 tablet by oral route  every day     • FISH OIL-omega-3 fatty acids (FISH OIL) 340-1,000 mg capsule Take 2 capsules by mouth daily.     • psyllium husk (METAMUCIL ORAL) Take by mouth.       No current facility-administered medications on file prior to visit.       ALLERGIES        Bactrim [sulfamethoxazole-trimethoprim] and Penicillins           REVIEW OF SYSTEMS      Review of Systems    Review of Systems   All other systems reviewed and are negative.       PHYSICAL EXAMINATION      Visit Vitals  Wt 104 kg (230 lb)   BMI 36.02 kg/m²      Body mass index is 36.02 kg/m².    BP Readings from Last 3 Encounters:   10/19/22 110/70   09/01/22 123/79   05/31/22 128/74     Wt Readings from Last 3 Encounters:   12/07/22 104 kg (230 lb)   10/19/22 106 kg (233 lb)   09/28/22 106 kg (233 lb)       Physical Exam  Vitals and nursing note reviewed.   Constitutional:       Appearance: She is well-developed. She is obese.   HENT:      Head: Atraumatic.      Right Ear: External ear normal.      Left Ear: External ear normal.      Nose: Nose  normal.      Mouth/Throat:      Mouth: Mucous membranes are moist.      Comments: Mallampati score 2+  Eyes:      Conjunctiva/sclera: Conjunctivae normal.   Cardiovascular:      Rate and Rhythm: Normal rate.   Pulmonary:      Effort: Pulmonary effort is normal.   Abdominal:      General: Bowel sounds are normal.   Musculoskeletal:         General: Normal range of motion.      Cervical back: Normal range of motion.   Skin:     General: Skin is warm and dry.   Neurological:      General: No focal deficit present.      Mental Status: She is alert and oriented to person, place, and time.   Psychiatric:         Mood and Affect: Mood normal.         Behavior: Behavior normal.         Thought Content: Thought content normal.         Judgment: Judgment normal.           LABS / IMAGING / EKG        Reviewed the following Labs:    Lab Results   Component Value Date    HGBA1C 5.4 07/05/2019    HGBA1C 5.6 09/29/2016     Lab Results   Component Value Date    CHOL 191 09/07/2022    CHOL 218 (H) 05/17/2021    CHOL 240 (H) 07/05/2019     Lab Results   Component Value Date    HDL 63 09/07/2022    HDL 65 05/17/2021    HDL 56 07/05/2019     Lab Results   Component Value Date    LDLCALC 102 (H) 09/07/2022    LDLCALC 123 (H) 05/17/2021    LDLCALC 148 (H) 07/05/2019     Lab Results   Component Value Date    TRIG 164 (H) 09/07/2022    TRIG 189 (H) 05/17/2021    TRIG 214 (H) 07/05/2019     No results found for: CHOLHDL      ASSESSMENT AND PLAN         BMI 36.0-36.9,adult  Continue current plan and close follow-up with team here at [facility].  Ensure adequate protein, fiber and 64 ounces of fluids per day.  Ensure dedicated exercise most days of the week.  Continue current meds.Naltrexone refilled.  Follow-up in 3 months or sooner as needed.        WHAT YOU SHOULD KNOW ABOUT NALTREXONE  Take naltrexone Half tab -25 mg daily    Please make sure to take your dose before 3 pm to prevent sleep disturbance  Avoid regular use of alcohol while on this  medication.      SIDE EFFECTS: Nausea, constipation, headache, vomiting, dizziness, insomnia, dry mouth, mild irritability.     WHO SHOULD NOT TAKE NALTREXONE: Patients with excessive alcohol use, chronic opioid use.      Thank you very much for allowing us to participate in the care of your patient. Please do not hesitate to call or email if there are any questions.

## 2022-12-07 NOTE — Clinical Note
Katie Encarnacion Hope you are having a good week! I had the pleasure of following up with Ms. Norwood  today in my [facility] clinic. She is very motivated to get to a better health.  She has lost 15 # and inches since March. We decided to continue Naltrexone to help with her healthy lifestyle modification. She is going to follow-up with the team here at [facility] including the dietitian and exercise physiologist to keep her accountable. I appreciate you letting us be a part of her weight loss journey. Please let me know if you have any questions or concerns. Best DT

## 2022-12-07 NOTE — PATIENT INSTRUCTIONS
Continue current plan and close follow-up with team here at [facility].  Ensure adequate protein, fiber and 64 ounces of fluids per day.  Ensure dedicated exercise most days of the week.  Continue current meds.Naltrexone refilled.  Follow-up in 3 months or sooner as needed.  WHAT YOU SHOULD KNOW ABOUT NALTREXONE  Half tab -25 mg daily    Please make sure to take your dose before 3 pm to prevent sleep disturbance  Avoid regular use of alcohol while on this medication.      SIDE EFFECTS: Nausea, constipation, headache, vomiting, dizziness, insomnia, dry mouth, mild irritability.     WHO SHOULD NOT TAKE NALTREXONE: Patients with excessive alcohol use, chronic opioid use.

## 2023-01-03 NOTE — TELEPHONE ENCOUNTER
Spoke with Barbra, she is aware that cologaurd is coming back negative. She is aware that we will recheck again in 3 years   no

## 2023-03-07 ENCOUNTER — APPOINTMENT (RX ONLY)
Dept: URBAN - METROPOLITAN AREA CLINIC 31 | Facility: CLINIC | Age: 48
Setting detail: DERMATOLOGY
End: 2023-03-07

## 2023-03-07 DIAGNOSIS — D18.0 HEMANGIOMA: ICD-10-CM

## 2023-03-07 DIAGNOSIS — L81.4 OTHER MELANIN HYPERPIGMENTATION: ICD-10-CM

## 2023-03-07 DIAGNOSIS — L72.8 OTHER FOLLICULAR CYSTS OF THE SKIN AND SUBCUTANEOUS TISSUE: ICD-10-CM

## 2023-03-07 DIAGNOSIS — D22 MELANOCYTIC NEVI: ICD-10-CM

## 2023-03-07 PROBLEM — D18.01 HEMANGIOMA OF SKIN AND SUBCUTANEOUS TISSUE: Status: ACTIVE | Noted: 2023-03-07

## 2023-03-07 PROBLEM — D23.72 OTHER BENIGN NEOPLASM OF SKIN OF LEFT LOWER LIMB, INCLUDING HIP: Status: ACTIVE | Noted: 2023-03-07

## 2023-03-07 PROBLEM — D22.5 MELANOCYTIC NEVI OF TRUNK: Status: ACTIVE | Noted: 2023-03-07

## 2023-03-07 PROBLEM — D23.5 OTHER BENIGN NEOPLASM OF SKIN OF TRUNK: Status: ACTIVE | Noted: 2023-03-07

## 2023-03-07 PROCEDURE — ? TREATMENT REGIMEN

## 2023-03-07 PROCEDURE — 99213 OFFICE O/P EST LOW 20 MIN: CPT

## 2023-03-07 PROCEDURE — ? FULL BODY SKIN EXAM

## 2023-03-07 PROCEDURE — ? COUNSELING

## 2023-03-07 ASSESSMENT — LOCATION DETAILED DESCRIPTION DERM
LOCATION DETAILED: SUPERIOR THORACIC SPINE
LOCATION DETAILED: RIGHT LATERAL MALAR CHEEK

## 2023-03-07 ASSESSMENT — LOCATION SIMPLE DESCRIPTION DERM
LOCATION SIMPLE: RIGHT CHEEK
LOCATION SIMPLE: UPPER BACK

## 2023-03-07 ASSESSMENT — LOCATION ZONE DERM
LOCATION ZONE: FACE
LOCATION ZONE: TRUNK

## 2023-03-07 NOTE — HPI: EVALUATION OF SKIN LESION(S)
What Type Of Note Output Would You Prefer (Optional)?: Bullet Format
Hpi Title: Evaluation of Skin Lesions
Family Member: Brother, grandmother, grandfather

## 2023-04-25 DIAGNOSIS — F33.42 RECURRENT MAJOR DEPRESSIVE DISORDER, IN FULL REMISSION (CMS/HCC): ICD-10-CM

## 2023-04-25 RX ORDER — CITALOPRAM 40 MG/1
TABLET, FILM COATED ORAL
Qty: 90 TABLET | Refills: 1 | Status: SHIPPED | OUTPATIENT
Start: 2023-04-25 | End: 2024-10-24 | Stop reason: SDUPTHER

## 2023-10-23 ENCOUNTER — OFFICE VISIT (OUTPATIENT)
Dept: PRIMARY CARE | Facility: CLINIC | Age: 48
End: 2023-10-23
Payer: COMMERCIAL

## 2023-10-23 VITALS
SYSTOLIC BLOOD PRESSURE: 118 MMHG | BODY MASS INDEX: 37.51 KG/M2 | HEIGHT: 67 IN | HEART RATE: 77 BPM | WEIGHT: 239 LBS | DIASTOLIC BLOOD PRESSURE: 88 MMHG | TEMPERATURE: 97.2 F | OXYGEN SATURATION: 96 % | RESPIRATION RATE: 16 BRPM

## 2023-10-23 DIAGNOSIS — M25.572 ARTHRALGIA OF LEFT ANKLE: ICD-10-CM

## 2023-10-23 DIAGNOSIS — M19.049 ARTHRITIS OF HAND: ICD-10-CM

## 2023-10-23 DIAGNOSIS — M17.10 ARTHRITIS OF KNEE: ICD-10-CM

## 2023-10-23 DIAGNOSIS — F32.5 MAJOR DEPRESSIVE DISORDER IN FULL REMISSION, UNSPECIFIED WHETHER RECURRENT (CMS/HCC): ICD-10-CM

## 2023-10-23 DIAGNOSIS — Z00.00 ROUTINE PHYSICAL EXAMINATION: Primary | ICD-10-CM

## 2023-10-23 PROBLEM — G44.89 OTHER HEADACHE SYNDROME: Status: RESOLVED | Noted: 2021-09-02 | Resolved: 2023-10-23
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
## 2023-10-23 PROCEDURE — 99396 PREV VISIT EST AGE 40-64: CPT | Performed by: FAMILY MEDICINE

## 2023-10-23 PROCEDURE — 3008F BODY MASS INDEX DOCD: CPT | Performed by: FAMILY MEDICINE

## 2023-10-23 ASSESSMENT — ENCOUNTER SYMPTOMS
NERVOUS/ANXIOUS: 0
ABDOMINAL PAIN: 0
CHILLS: 0
WHEEZING: 0
HEADACHES: 0
BLOOD IN STOOL: 0
FATIGUE: 0
FREQUENCY: 0
PALPITATIONS: 0
SLEEP DISTURBANCE: 0
RHINORRHEA: 0
DYSURIA: 0
COUGH: 0
SHORTNESS OF BREATH: 0
CONSTIPATION: 0
FEVER: 0
DIZZINESS: 0
SORE THROAT: 0
MYALGIAS: 0
HEMATURIA: 0
ARTHRALGIAS: 0

## 2023-10-23 ASSESSMENT — PATIENT HEALTH QUESTIONNAIRE - PHQ9: SUM OF ALL RESPONSES TO PHQ9 QUESTIONS 1 & 2: 1

## 2023-10-23 NOTE — PROGRESS NOTES
Subjective      Patient ID: Barbra Norwood is a 48 y.o. female.    She is here for physical. Pt has been doing ok overall. She is dealing with L ankle arthritis seeing podiatrist and did have injection and did use boot and now ordering custom orthodontics and for now wearing ankle brace with prolonged activities. She is trying to follow well balanced meals- she knows she could be better. She is able to bike for 15min and weights for 30min 5x wk. Stays well hydrated. Average 6- 7hrs of sleep a night. Normal BMs and urination. UTD with cologuard in 2022 and due in 3yrs. UTD with eye and dental exam. Doesn't get periods with the pill. She will see ob/gyn in 2 wks and plan is to stop the birth control and thinking about discuss next steps with her. UTD with gyn care. UTD eye and dental exam. She does have knee and hand arthritis and thinking she needs to see rheumo. She is seeing therapist monthly which has been helpful. Due for fasting labs. Not interested in updating immunizations today.       The following have been reviewed and updated as appropriate in this visit:   Allergies  Meds  Problems  Med Hx  Surg Hx  Fam Hx       Review of Systems   Constitutional: Negative for chills, fatigue and fever.   HENT: Negative for ear discharge, ear pain, postnasal drip, rhinorrhea and sore throat.    Respiratory: Negative for cough, shortness of breath and wheezing.    Cardiovascular: Negative for chest pain and palpitations.   Gastrointestinal: Negative for abdominal pain, blood in stool and constipation.   Genitourinary: Negative for dysuria, frequency and hematuria.   Musculoskeletal: Negative for arthralgias and myalgias.   Skin: Negative for rash.   Neurological: Negative for dizziness and headaches.   Psychiatric/Behavioral: Negative for sleep disturbance and suicidal ideas. The patient is not nervous/anxious.        Current Outpatient Medications   Medication Sig Dispense Refill    ascorbic acid (VITAMIN C) 500  mg tablet Take 500 mg by mouth daily.      biotin 300 mcg tablet Take by mouth daily.      cholecalciferol, vitamin D3, 1,000 unit (25 mcg) tablet Take 1,000 Units by mouth daily.      citalopram (celeXA) 40 mg tablet TAKE 1 TABLET BY MOUTH EVERY DAY 90 tablet 1    desog-e.estradioL/e.estradioL (KARIVA) 0.15-0.02 mgx21 /0.01 mg x 5 per tablet take 1 tablet by oral route  every day      FISH OIL-omega-3 fatty acids (FISH OIL) 340-1,000 mg capsule Take 2 capsules by mouth daily.       No current facility-administered medications for this visit.     Past Medical History:   Diagnosis Date    Arthritis     L ankle s/p injection    Depression      Family History   Problem Relation Age of Onset    Breast cancer Biological Mother     Arthritis Biological Mother     Arthritis Maternal Grandmother     Arthritis Maternal Grandfather     Breast cancer Mother's Sister     Autism Biological Child     Pulmonary embolism Biological Father      Past Surgical History:   Procedure Laterality Date     SECTION      2x    CYST REMOVAL  2019    TONSILLECTOMY       Social History     Socioeconomic History    Marital status:      Spouse name: Not on file    Number of children: 2    Years of education: Not on file    Highest education level: Not on file   Occupational History    Occupation: Health    Tobacco Use    Smoking status: Never    Smokeless tobacco: Never   Vaping Use    Vaping Use: Never used   Substance and Sexual Activity    Alcohol use: Yes     Comment: rarely    Drug use: No    Sexual activity: Yes     Partners: Male   Other Topics Concern    Not on file   Social History Narrative    Not on file     Social Determinants of Health     Financial Resource Strain: Not on file   Food Insecurity: Not on file   Transportation Needs: Not on file   Physical Activity: Not on file   Stress: Not on file   Social Connections: Not on file   Intimate Partner Violence: Not on  "file   Housing Stability: Not on file     Allergies   Allergen Reactions    Bactrim [Sulfamethoxazole-Trimethoprim] Hives    Penicillins Angioedema       Objective   Visit Vitals  /88 (BP Location: Left upper arm, Patient Position: Sitting)   Pulse 77   Temp 36.2 °C (97.2 °F) (Temporal)   Resp 16   Ht 1.702 m (5' 7\")   Wt 108 kg (239 lb) Comment: without shoes   LMP  (LMP Unknown)   SpO2 96%   BMI 37.43 kg/m²     Physical Exam  Vitals and nursing note reviewed.   Constitutional:       Appearance: Normal appearance.   HENT:      Right Ear: Tympanic membrane normal. There is no impacted cerumen.      Left Ear: Tympanic membrane normal. There is no impacted cerumen.      Nose: Nose normal. No rhinorrhea.      Mouth/Throat:      Mouth: Mucous membranes are moist.      Pharynx: Oropharynx is clear. No posterior oropharyngeal erythema.   Cardiovascular:      Rate and Rhythm: Normal rate and regular rhythm.      Heart sounds: No murmur heard.  Pulmonary:      Effort: Pulmonary effort is normal.      Breath sounds: Normal breath sounds. No wheezing.   Abdominal:      General: Bowel sounds are normal.      Palpations: Abdomen is soft.      Tenderness: There is no abdominal tenderness.   Musculoskeletal:         General: Normal range of motion.      Cervical back: Normal range of motion and neck supple.   Skin:     General: Skin is warm.      Findings: No rash.   Neurological:      General: No focal deficit present.      Mental Status: She is alert and oriented to person, place, and time.      Cranial Nerves: No cranial nerve deficit.   Psychiatric:         Mood and Affect: Mood normal.         Behavior: Behavior normal.         Assessment/Plan   Problem List Items Addressed This Visit        Mental Health    Depression     Stable. Seeing psych        Other Visit Diagnoses     Routine physical examination    -  Primary    Relevant Orders    Comprehensive metabolic panel    TSH    Hemoglobin A1c    Lipid panel    CBC "    Arthralgia of left ankle        Relevant Orders    JULI Screen (Automated)    Rheumatoid factor    Arthritis of knee        Relevant Orders    JULI Screen (Automated)    Rheumatoid factor    Arthritis of hand        Relevant Orders    JULI Screen (Automated)    Rheumatoid factor      She is doing well overall. We discussed cont with diet and exercise. Will check updated fasting labs. She does have arthritis of ankle and could have arthritis of hands and knees so will check updated labs and see rheumo. UTD with eye and dental exam. UTD with cologuard. UTD with mammo. Not interested in updating immunizations at this time. Will see gyn for annual in 2wks and she will see how the blood pressure looks and keep me updated!    Mignon Encarnacion, DO  10/23/2023

## 2023-11-27 ENCOUNTER — OFFICE VISIT (OUTPATIENT)
Dept: PRIMARY CARE | Facility: CLINIC | Age: 48
End: 2023-11-27
Payer: COMMERCIAL

## 2023-11-27 VITALS
WEIGHT: 241 LBS | RESPIRATION RATE: 16 BRPM | BODY MASS INDEX: 37.83 KG/M2 | DIASTOLIC BLOOD PRESSURE: 82 MMHG | SYSTOLIC BLOOD PRESSURE: 122 MMHG | TEMPERATURE: 98.2 F | HEIGHT: 67 IN | OXYGEN SATURATION: 97 % | HEART RATE: 76 BPM

## 2023-11-27 DIAGNOSIS — J01.40 ACUTE NON-RECURRENT PANSINUSITIS: Primary | ICD-10-CM

## 2023-11-27 PROCEDURE — 99214 OFFICE O/P EST MOD 30 MIN: CPT | Performed by: FAMILY MEDICINE

## 2023-11-27 PROCEDURE — 3008F BODY MASS INDEX DOCD: CPT | Performed by: FAMILY MEDICINE

## 2023-11-27 RX ORDER — NORETHINDRONE ACETATE AND ETHINYL ESTRADIOL, ETHINYL ESTRADIOL AND FERROUS FUMARATE 1MG-10(24)
1 KIT ORAL DAILY
Qty: 84 TABLET | Refills: 3 | Status: SHIPPED | OUTPATIENT
Start: 2023-11-27 | End: 2023-11-27 | Stop reason: SDUPTHER

## 2023-11-27 RX ORDER — DOXYCYCLINE HYCLATE 100 MG
100 TABLET ORAL 2 TIMES DAILY
Qty: 20 TABLET | Refills: 0 | Status: SHIPPED | OUTPATIENT
Start: 2023-11-27 | End: 2023-12-07

## 2023-11-27 RX ORDER — NORETHINDRONE ACETATE AND ETHINYL ESTRADIOL, ETHINYL ESTRADIOL AND FERROUS FUMARATE 1MG-10(24)
1 KIT ORAL DAILY
Qty: 84 TABLET | Refills: 3 | Status: SHIPPED | OUTPATIENT
Start: 2023-11-27 | End: 2025-02-03

## 2023-11-27 ASSESSMENT — ENCOUNTER SYMPTOMS
DYSURIA: 0
FREQUENCY: 0
ABDOMINAL PAIN: 0
HEMATURIA: 0
WHEEZING: 0
SINUS PRESSURE: 1
COUGH: 1
FATIGUE: 1
SORE THROAT: 0
PALPITATIONS: 0
SHORTNESS OF BREATH: 0
SLEEP DISTURBANCE: 0
MYALGIAS: 0
NERVOUS/ANXIOUS: 0
BLOOD IN STOOL: 0
CONSTIPATION: 0
ARTHRALGIAS: 0
DIZZINESS: 0
RHINORRHEA: 0
SINUS PAIN: 1
FEVER: 0
HEADACHES: 0
CHILLS: 0

## 2023-11-27 NOTE — PROGRESS NOTES
Subjective      Patient ID: Barbra Norwood is a 48 y.o. female.    She is here for cold symptoms x 2wks. She started with sore throat and nasal congestion with body aches and mucinex/sudafed/advil cold&sinus and was not able to move much for few days and does have hoarse voice. Ear pressure and fullness L>R. No fevers. No cough. No sick contacts. She was away weekend in [place] weekend before her symptoms. Stays well hydrated. + fatigue. She did not check for COVID. Post nasal drip. No wheezing. +sinus pain and pressure.       The following have been reviewed and updated as appropriate in this visit:   Tobacco  Allergies  Problems  Med Hx  Surg Hx       Review of Systems   Constitutional: Positive for fatigue. Negative for chills and fever.   HENT: Positive for congestion, sinus pressure and sinus pain. Negative for ear discharge, ear pain, postnasal drip, rhinorrhea and sore throat.         +hoarse voice   Respiratory: Positive for cough. Negative for shortness of breath and wheezing.    Cardiovascular: Negative for chest pain and palpitations.   Gastrointestinal: Negative for abdominal pain, blood in stool and constipation.   Genitourinary: Negative for dysuria, frequency and hematuria.   Musculoskeletal: Negative for arthralgias and myalgias.   Skin: Negative for rash.   Neurological: Negative for dizziness and headaches.   Psychiatric/Behavioral: Negative for sleep disturbance and suicidal ideas. The patient is not nervous/anxious.        Current Outpatient Medications   Medication Sig Dispense Refill    ascorbic acid (VITAMIN C) 500 mg tablet Take 500 mg by mouth daily.      biotin 300 mcg tablet Take by mouth daily.      cholecalciferol, vitamin D3, 1,000 unit (25 mcg) tablet Take 1,000 Units by mouth daily.      citalopram (celeXA) 40 mg tablet TAKE 1 TABLET BY MOUTH EVERY DAY 90 tablet 1    doxycycline hyclate (VIBRA-TABS) 100 mg tablet Take 1 tablet (100 mg total) by mouth 2 (two) times a day for  10 days. 20 tablet 0    FISH OIL-omega-3 fatty acids (FISH OIL) 340-1,000 mg capsule Take 2 capsules by mouth daily.      norethindrone-e.estradioL-iron (LO LOESTRIN FE) 1 mg-10 mcg (24)/10 mcg (2) per tablet Take 1 tablet by mouth daily. 84 tablet 3     No current facility-administered medications for this visit.     Past Medical History:   Diagnosis Date    Arthritis     L ankle s/p injection    Depression      Family History   Problem Relation Age of Onset    Breast cancer Biological Mother     Arthritis Biological Mother     Pulmonary embolism Biological Father     Prostate cancer Biological Father     Arthritis Maternal Grandmother     Arthritis Maternal Grandfather     Autism Biological Child     Breast cancer Mother's Sister      Past Surgical History:   Procedure Laterality Date     SECTION      2x    CYST REMOVAL  2019    TONSILLECTOMY       Social History     Socioeconomic History    Marital status:      Spouse name: Not on file    Number of children: 2    Years of education: Not on file    Highest education level: Not on file   Occupational History    Occupation: Health    Tobacco Use    Smoking status: Never    Smokeless tobacco: Never   Vaping Use    Vaping Use: Never used   Substance and Sexual Activity    Alcohol use: Yes     Comment: rarely    Drug use: No    Sexual activity: Yes     Partners: Male   Other Topics Concern    Not on file   Social History Narrative    Not on file     Social Determinants of Health     Financial Resource Strain: Not on file   Food Insecurity: Not on file   Transportation Needs: Not on file   Physical Activity: Not on file   Stress: Not on file   Social Connections: Not on file   Intimate Partner Violence: Not on file   Housing Stability: Not on file     Allergies   Allergen Reactions    Bactrim [Sulfamethoxazole-Trimethoprim] Hives    Penicillins Angioedema       Objective   Visit Vitals  /82 (BP  "Location: Left upper arm, Patient Position: Sitting)   Pulse 76   Temp 36.8 °C (98.2 °F) (Oral)   Resp 16   Ht 1.702 m (5' 7\")   Wt 109 kg (241 lb)   SpO2 97%   BMI 37.75 kg/m²     Physical Exam  Constitutional:       Appearance: Normal appearance.      Comments: Hoarse voice   HENT:      Right Ear: Tympanic membrane normal. There is no impacted cerumen.      Left Ear: Tympanic membrane normal. There is no impacted cerumen.      Nose: Congestion present. No rhinorrhea.      Comments: Tender maxillary and frontal sinuses along with glands     Mouth/Throat:      Mouth: Mucous membranes are moist.      Pharynx: Oropharynx is clear. Posterior oropharyngeal erythema present.   Cardiovascular:      Rate and Rhythm: Normal rate and regular rhythm.      Heart sounds: No murmur heard.  Pulmonary:      Effort: Pulmonary effort is normal.      Breath sounds: Normal breath sounds. No wheezing.   Abdominal:      General: Bowel sounds are normal.      Palpations: Abdomen is soft.      Tenderness: There is no abdominal tenderness.   Musculoskeletal:         General: Normal range of motion.      Cervical back: Normal range of motion and neck supple.   Skin:     General: Skin is warm.      Findings: No rash.   Neurological:      General: No focal deficit present.      Mental Status: She is alert and oriented to person, place, and time.      Cranial Nerves: No cranial nerve deficit.   Psychiatric:         Mood and Affect: Mood normal.         Behavior: Behavior normal.         Assessment/Plan   Problem List Items Addressed This Visit    None  Visit Diagnoses     Acute non-recurrent pansinusitis    -  Primary    Relevant Medications    doxycycline hyclate (VIBRA-TABS) 100 mg tablet      Given symptoms are lasting over 2wks and not getting better will cover with abx along with fluids, rest and supportive care and will keep me updated over next few days!    Mignon Encarnacion, DO  11/27/2023  "

## 2023-12-05 LAB
ALBUMIN SERPL-MCNC: 4 G/DL (ref 3.6–5.1)
ALBUMIN/GLOB SERPL: 1.3 (CALC) (ref 1–2.5)
ALP SERPL-CCNC: 75 U/L (ref 31–125)
ALT SERPL-CCNC: 11 U/L (ref 6–29)
ANA SER QL IF: NEGATIVE
AST SERPL-CCNC: 12 U/L (ref 10–35)
BILIRUB SERPL-MCNC: 0.6 MG/DL (ref 0.2–1.2)
BUN SERPL-MCNC: 22 MG/DL (ref 7–25)
BUN/CREAT SERPL: NORMAL (CALC) (ref 6–22)
CALCIUM SERPL-MCNC: 9.2 MG/DL (ref 8.6–10.2)
CHLORIDE SERPL-SCNC: 106 MMOL/L (ref 98–110)
CHOLEST SERPL-MCNC: 234 MG/DL
CHOLEST/HDLC SERPL: 3.3 (CALC)
CO2 SERPL-SCNC: 25 MMOL/L (ref 20–32)
CREAT SERPL-MCNC: 0.85 MG/DL (ref 0.5–0.99)
EGFRCR SERPLBLD CKD-EPI 2021: 84 ML/MIN/1.73M2
ERYTHROCYTE [DISTWIDTH] IN BLOOD BY AUTOMATED COUNT: 12.2 % (ref 11–15)
GLOBULIN SER CALC-MCNC: 3 G/DL (CALC) (ref 1.9–3.7)
GLUCOSE SERPL-MCNC: 84 MG/DL (ref 65–99)
HBA1C MFR BLD: 5.5 % OF TOTAL HGB
HCT VFR BLD AUTO: 39.3 % (ref 35–45)
HDLC SERPL-MCNC: 71 MG/DL
HGB BLD-MCNC: 13.2 G/DL (ref 11.7–15.5)
LDLC SERPL CALC-MCNC: 129 MG/DL (CALC)
MCH RBC QN AUTO: 31 PG (ref 27–33)
MCHC RBC AUTO-ENTMCNC: 33.6 G/DL (ref 32–36)
MCV RBC AUTO: 92.3 FL (ref 80–100)
NONHDLC SERPL-MCNC: 163 MG/DL (CALC)
PLATELET # BLD AUTO: 250 THOUSAND/UL (ref 140–400)
PMV BLD REES-ECKER: 11.3 FL (ref 7.5–12.5)
POTASSIUM SERPL-SCNC: 4.2 MMOL/L (ref 3.5–5.3)
PROT SERPL-MCNC: 7 G/DL (ref 6.1–8.1)
RBC # BLD AUTO: 4.26 MILLION/UL (ref 3.8–5.1)
RHEUMATOID FACT SERPL-ACNC: <14 IU/ML
SODIUM SERPL-SCNC: 140 MMOL/L (ref 135–146)
TRIGL SERPL-MCNC: 205 MG/DL
TSH SERPL-ACNC: 2.34 MIU/L
WBC # BLD AUTO: 8.6 THOUSAND/UL (ref 3.8–10.8)

## 2024-07-25 ENCOUNTER — APPOINTMENT (RX ONLY)
Dept: URBAN - METROPOLITAN AREA CLINIC 31 | Facility: CLINIC | Age: 49
Setting detail: DERMATOLOGY
End: 2024-07-25

## 2024-07-25 DIAGNOSIS — L81.4 OTHER MELANIN HYPERPIGMENTATION: ICD-10-CM

## 2024-07-25 DIAGNOSIS — D18.0 HEMANGIOMA: ICD-10-CM

## 2024-07-25 DIAGNOSIS — D22 MELANOCYTIC NEVI: ICD-10-CM

## 2024-07-25 PROBLEM — D18.01 HEMANGIOMA OF SKIN AND SUBCUTANEOUS TISSUE: Status: ACTIVE | Noted: 2024-07-25

## 2024-07-25 PROBLEM — D22.5 MELANOCYTIC NEVI OF TRUNK: Status: ACTIVE | Noted: 2024-07-25

## 2024-07-25 PROBLEM — D23.5 OTHER BENIGN NEOPLASM OF SKIN OF TRUNK: Status: ACTIVE | Noted: 2024-07-25

## 2024-07-25 PROCEDURE — ? FULL BODY SKIN EXAM

## 2024-07-25 PROCEDURE — 99213 OFFICE O/P EST LOW 20 MIN: CPT

## 2024-07-25 PROCEDURE — ? OBSERVATION

## 2024-07-25 PROCEDURE — ? TREATMENT REGIMEN

## 2024-07-25 PROCEDURE — ? COUNSELING

## 2024-07-25 ASSESSMENT — LOCATION ZONE DERM: LOCATION ZONE: TRUNK

## 2024-07-25 ASSESSMENT — LOCATION SIMPLE DESCRIPTION DERM: LOCATION SIMPLE: UPPER BACK

## 2024-07-25 ASSESSMENT — LOCATION DETAILED DESCRIPTION DERM: LOCATION DETAILED: SUPERIOR THORACIC SPINE

## 2024-10-24 ENCOUNTER — OFFICE VISIT (OUTPATIENT)
Dept: PRIMARY CARE | Facility: CLINIC | Age: 49
End: 2024-10-24
Payer: COMMERCIAL

## 2024-10-24 VITALS
DIASTOLIC BLOOD PRESSURE: 86 MMHG | OXYGEN SATURATION: 96 % | SYSTOLIC BLOOD PRESSURE: 122 MMHG | TEMPERATURE: 97.7 F | HEART RATE: 83 BPM | BODY MASS INDEX: 36.88 KG/M2 | WEIGHT: 235 LBS | HEIGHT: 67 IN | RESPIRATION RATE: 18 BRPM

## 2024-10-24 DIAGNOSIS — E66.812 CLASS 2 OBESITY DUE TO EXCESS CALORIES WITHOUT SERIOUS COMORBIDITY WITH BODY MASS INDEX (BMI) OF 35.0 TO 35.9 IN ADULT: ICD-10-CM

## 2024-10-24 DIAGNOSIS — Z00.00 ROUTINE PHYSICAL EXAMINATION: Primary | ICD-10-CM

## 2024-10-24 DIAGNOSIS — Z23 NEED FOR IMMUNIZATION AGAINST INFLUENZA: ICD-10-CM

## 2024-10-24 DIAGNOSIS — F33.42 RECURRENT MAJOR DEPRESSIVE DISORDER, IN FULL REMISSION (CMS/HCC): ICD-10-CM

## 2024-10-24 DIAGNOSIS — E66.09 CLASS 2 OBESITY DUE TO EXCESS CALORIES WITHOUT SERIOUS COMORBIDITY WITH BODY MASS INDEX (BMI) OF 35.0 TO 35.9 IN ADULT: ICD-10-CM

## 2024-10-24 PROCEDURE — 3008F BODY MASS INDEX DOCD: CPT | Performed by: FAMILY MEDICINE

## 2024-10-24 PROCEDURE — 90656 IIV3 VACC NO PRSV 0.5 ML IM: CPT | Performed by: FAMILY MEDICINE

## 2024-10-24 PROCEDURE — 99396 PREV VISIT EST AGE 40-64: CPT | Mod: 25 | Performed by: FAMILY MEDICINE

## 2024-10-24 PROCEDURE — 90471 IMMUNIZATION ADMIN: CPT | Performed by: FAMILY MEDICINE

## 2024-10-24 RX ORDER — CITALOPRAM 40 MG/1
40 TABLET, FILM COATED ORAL DAILY
Qty: 90 TABLET | Refills: 2 | Status: SHIPPED | OUTPATIENT
Start: 2024-10-24

## 2024-10-24 RX ORDER — MELOXICAM 7.5 MG/1
TABLET ORAL
COMMUNITY
Start: 2024-10-05

## 2024-10-24 ASSESSMENT — PATIENT HEALTH QUESTIONNAIRE - PHQ9: SUM OF ALL RESPONSES TO PHQ9 QUESTIONS 1 & 2: 0

## 2024-10-24 NOTE — PATIENT INSTRUCTIONS
Flu vaccine today     Thinks about establishing some rules about your eating to cut back on snacking and halie      Talk with GYN about whether you should continue Ocp      Lab fasting

## 2024-10-24 NOTE — PROGRESS NOTES
Jayna Desouza MD   [facility] [place]   [address], [address]  [place] [address]  P: [phone]  F: [phone]         Reason for visit:    Chief Complaint   Patient presents with    Annual Exam       Barbra BROOKS 1975 is a 49 y.o. female   who presents for ANNUAL PHYSICA with me in Dr Flower absence     Last pe 1 yr ago   Ankle arthritis persists see last yr notes takes meloxicam occasionally    Takes pilates ; uses brace when extended walking  has seen orht    Takes celexa started on for depression working well  Obesity     Mammo limerick  was normla   SUBJECTIVE   Preventive Care   Routine Health Exams:   Up to date:   [x] Vision  [x] Dental  [x] Derm/Skin Exam  [x] GYN Dr Yi no abnl ap     Health Maintenance:  Health Maintenance Topics with due status: Overdue       Topic Date Due    HIV Screening Never done    Hepatitis C Screening Never done    Hepatitis B Vaccines Never done    Zoster Vaccine Never done    Breast Cancer Screening 2024    COVID-19 Vaccine 2024     Health Maintenance Topics with due status: Not Due       Topic Last Completion Date    Cervical Cancer Screening 2021    DTaP, Tdap, and Td Vaccines 2021    Colorectal Cancer Screening 2022    Depression Screening 10/24/2024    RSV Vaccine Not Due     Health Maintenance Topics with due status: Completed       Topic Last Completion Date    Influenza Vaccine 10/24/2024     Health Maintenance Topics with due status: Aged Out       Topic Date Due    Meningococcal ACWY Aged Out    RSV <20 months Aged Out    HIB Vaccines Aged Out    IPV Vaccines Aged Out    HPV Vaccines Aged Out    Pneumococcal Aged Out       Immunizations:   Immunization History   Administered Date(s) Administered    Influenza Vaccine Trivalent Preservative Free 6 Mons And Up 10/24/2024    SARS-COV-2 (COVID-19) VACCINE PFIZER BIVALENT 12 years and older ([facility]) 2022    SARS-COV-2  (COVID-19) VACCINE, MODERNA MONOVALENT 04/02/2021, 05/08/2021    SARS-COV-2 (COVID19) VACCINE, PFIZER MONOVALENT 01/14/2022    Tdap 09/02/2021                                 Cancer Screenings:  Colon  Breast  Cervical  Prostate  Lung    Other Screenings:  Osteoporosis  AAA    Diet:    [] Balanced  [] Healthy                [] Pescatarian  [] No red meat  [] Vegetarian  [] Vegan                [] Dairy free  [] Gluten free  [] Diabetic  [] Cardiac              [x] Needs improvement  2 changes  bordem  and stress eatimg  is a grazer and she snacks with him   Exercise:   x/week  pilates 2x wk                   [] Cardio  [] Strength  [x] Walking                     [] Needs improvement     Personal Safety: Feels safe.     Environmental Safety: [x] Seatbelts  [x] Recreation Safety Gear                                           [x] Smoke Detectors  [] Firearms     Care Team: Patient Care Team:  Mignon Encarnacion DO as PCP - General  Katie Nye RD as Dietitian (Dietitian)       Social History   Household: lives with  17 and 23 yo and    Employment Status: in office hospie manager       Children: 2  Pets:    Tobacco:  no  Social History     Tobacco Use   Smoking Status Never   Smokeless Tobacco Never        Alcohol:   Social History     Substance and Sexual Activity   Alcohol Use Yes    Comment: rarely       Drugs:    Social History     Substance and Sexual Activity   Drug Use No          Sexual Health   Sex at birth: female  Gender Identity: female     Relationship Status:                                         [x] Monogamous  [] Non-monogamous  Sexual Activity:         [] Yes  [] No  [] Previously  [] Never  Body Parts Used: [] Oral  [] Genital  [] Anal  STI Prevention:  Condoms:  [] Always  [] Sometimes  [] Never  [] PrEP  Contraception: [] Condom  [] OCP  [] Ring  [] Implant  [] IUD  [] Depo  [] Patch                               [] Abstinent  [] Menopausal  [] Tubal Ligation  []  Hysterectomy  Pregnancy History:   OB History   No obstetric history on file.         LMP: No LMP recorded. Patient is perimenopausal.  Stopped still on ocp     Mental Health   Concerns: none depression well treated with celexaa  PHQ 2 to 9:  Will the patient answer the depression questions?: Y    Little interest or pleasure in doing things: Not at all    Feeling down, depressed, or hopeless: Not at all    Depression Risk: 0    No data recorded  No data recorded  No data recorded  No data recorded  No data recorded  No data recorded  No data recorded  No data recorded  No data recorded  No data recorded    MYRIAM-7  No data recorded  No data recorded  No data recorded  No data recorded  No data recorded  No data recorded  No data recorded    No data recorded    No data recorded     Additional health concerns outside of routine preventive care:   New complaint:  #:  Patient presents c/o:   States:  Reports:   Denies:   Hx of same?   Self-tx attempts/outcomes:  Relevant Hx:  Questions:    Follow-up:  #  Patient presents for follow-up regarding ongoing:  States:   Reports:  Denies:  Treatment:  Treatment plan adherence/issues:  Questions:  Concerns:       Problem List   Patient Active Problem List   Diagnosis Code    Allergic rhinitis J30.9    Depression F32.A    BMI 36.0-36.9,adult Z68.36    Other hyperlipidemia E78.49    Vitamin D deficiency E55.9    Snoring R06.83    Class 2 obesity due to excess calories without serious comorbidity with body mass index (BMI) of 35.0 to 35.9 in adult E66.812, E66.09, Z68.35      Allergies   Bactrim [sulfamethoxazole-trimethoprim] and Penicillins    Medications     Current Outpatient Medications:     ascorbic acid (VITAMIN C) 500 mg tablet, Take 500 mg by mouth daily., Disp: , Rfl:     biotin 300 mcg tablet, Take by mouth daily., Disp: , Rfl:     cholecalciferol, vitamin D3, 1,000 unit (25 mcg) tablet, Take 1,000 Units by mouth daily., Disp: , Rfl:     citalopram (celeXA) 40 mg tablet,  Take 1 tablet (40 mg total) by mouth daily., Disp: 90 tablet, Rfl: 2    FISH OIL-omega-3 fatty acids (FISH OIL) 340-1,000 mg capsule, Take 2 capsules by mouth daily., Disp: , Rfl:     meloxicam (MOBIC) 7.5 mg tablet, QR AS NEEDED FOR PAIN IF NEEDED CAN INCREASE TO TAKE 2 TABLETS EVERY DAY, Disp: , Rfl:     norethindrone-e.estradioL-iron (LO LOESTRIN FE) 1 mg-10 mcg (24)/10 mcg (2) per tablet, Take 1 tablet by mouth daily., Disp: 84 tablet, Rfl: 3   Medical History    Past Medical History:  No date: Arthritis      Comment:  L ankle s/p injection  No date: Depression    Surgical History   Past Surgical History   Procedure Laterality Date     section      2x    Cyst removal  2019    Tonsillectomy        Family History   Family History   Problem Relation Name Age of Onset    Breast cancer Biological Mother      Arthritis Biological Mother      Pulmonary embolism Biological Father      Prostate cancer Biological Father      Arthritis Maternal Grandmother      Arthritis Maternal Grandfather      Autism Biological Child      Breast cancer Mother's Sister        Review of Systems   Constitutional: denies fever, chills   Respiratory: denies shortness of breath  Cardiovascular: denies chest pain, palpitations  Gastrointestinal: denies abdominal pain, n/v/d  Genitourinary: denies dysuria, hematuria  Musculoskeletal: denies joint pain, myalgias  Skin: denies rash   Neurological: denies weakness, numbness  See HPI for abnormal systems and evaluation of them.     OBJECTIVE   Vital Signs   Wt Readings from Last 3 Encounters:   10/24/24 107 kg (235 lb)   23 109 kg (241 lb)   10/23/23 108 kg (239 lb)     Temp Readings from Last 3 Encounters:   10/24/24 36.5 °C (97.7 °F) (Temporal)   23 36.8 °C (98.2 °F) (Oral)   10/23/23 36.2 °C (97.2 °F) (Temporal)     BP Readings from Last 3 Encounters:   10/24/24 122/86   23 122/82   10/23/23 118/88     Pulse Readings from Last 3 Encounters:   10/24/24 83   23  76   10/23/23 77     Body mass index is 36.81 kg/m².  No LMP recorded. Patient is perimenopausal.      Physical Exam   Physical Exam  Constitutional:       Appearance: Normal appearance. She is well-developed. She is obese.   HENT:      Head: Normocephalic and atraumatic.      Right Ear: Tympanic membrane, ear canal and external ear normal.      Left Ear: Tympanic membrane, ear canal and external ear normal.      Nose: Nose normal.      Mouth/Throat:      Mouth: Mucous membranes are moist.      Pharynx: Oropharynx is clear. No oropharyngeal exudate.   Eyes:      General: No scleral icterus.        Right eye: No discharge.         Left eye: No discharge.      Conjunctiva/sclera: Conjunctivae normal.      Pupils: Pupils are equal, round, and reactive to light.   Neck:      Thyroid: No thyromegaly.      Vascular: No carotid bruit.   Cardiovascular:      Rate and Rhythm: Normal rate and regular rhythm.      Heart sounds: Normal heart sounds. No murmur heard.     No friction rub. No gallop.   Pulmonary:      Effort: Pulmonary effort is normal. No respiratory distress.      Breath sounds: Normal breath sounds. No wheezing or rales.   Chest:      Chest wall: No tenderness.   Abdominal:      General: Bowel sounds are normal. There is no distension.      Palpations: Abdomen is soft. There is no mass.      Tenderness: There is no abdominal tenderness. There is no guarding or rebound.   Musculoskeletal:      Cervical back: Normal range of motion and neck supple. No tenderness.      Right lower leg: No edema.      Left lower leg: No edema.   Lymphadenopathy:      Cervical: No cervical adenopathy.   Skin:     General: Skin is warm and dry.      Capillary Refill: Capillary refill takes less than 2 seconds.      Findings: No rash.   Neurological:      Mental Status: She is alert and oriented to person, place, and time.      Cranial Nerves: No cranial nerve deficit.   Psychiatric:         Mood and Affect: Mood normal.          Behavior: Behavior normal.         Thought Content: Thought content normal.         Judgment: Judgment normal.           Results   Lab Review:  Lab Results   Component Value Date    WBC 8.6 12/02/2023    HGB 13.2 12/02/2023    HCT 39.3 12/02/2023     12/02/2023    CHOL 234 (H) 12/02/2023    TRIG 205 (H) 12/02/2023    HDL 71 12/02/2023    ALT 11 12/02/2023    AST 12 12/02/2023     12/02/2023    K 4.2 12/02/2023     12/02/2023    CREATININE 0.85 12/02/2023    BUN 22 12/02/2023    CO2 25 12/02/2023    TSH 2.34 12/02/2023    HGBA1C 5.5 12/02/2023    LDLCALC 129 (H) 12/02/2023        ASSESSMENT & PLAN   Diagnosis & Treatment   Problem List Items Addressed This Visit          Endocrine/Metabolic    Class 2 obesity due to excess calories without serious comorbidity with body mass index (BMI) of 35.0 to 35.9 in adult       Mental Health    Depression    Overview     Overview:          Relevant Medications    citalopram (celeXA) 40 mg tablet     Other Visit Diagnoses       Routine physical examination    -  Primary    Relevant Orders    Comprehensive metabolic panel    Lipid panel    Need for immunization against influenza        Relevant Orders    Influenza vaccine trivalent preservative free 6 mons and older IM (FluLaval) (Completed)             Follow-up   No follow-ups on file.     Patient Instructions   Flu vaccine today     Thinks about establishing some rules about your eating to cut back on snacking and bordem      Talk with GYN about whether you should continue Ocp      Lab fasting            Jayna Desouza MD  10/24/2024

## 2025-02-02 DIAGNOSIS — Z30.41 ENCOUNTER FOR SURVEILLANCE OF CONTRACEPTIVE PILLS: Primary | ICD-10-CM

## 2025-02-03 RX ORDER — NORETHINDRONE ACETATE AND ETHINYL ESTRADIOL, ETHINYL ESTRADIOL AND FERROUS FUMARATE 1MG-10(24)
1 KIT ORAL DAILY
Qty: 84 TABLET | Refills: 2 | Status: SHIPPED | OUTPATIENT
Start: 2025-02-03

## 2025-07-28 ENCOUNTER — NURSE TRIAGE (OUTPATIENT)
Dept: PRIMARY CARE | Facility: CLINIC | Age: 50
End: 2025-07-28

## 2025-07-28 NOTE — TELEPHONE ENCOUNTER
"Reason for Conversation  Patient transferred to the Primary Care Nurse Triage Line with complaints of:  Nurse Triage Call and Shortness of Breath      Background  51 y/o female called the office today with complaints of shortness of breath. Patient reports a new onset of dyspnea on exertion that she first noticed while on vacation 1 month ago in [place]. She was walking through the woods at the time she first noticed symptoms so she paid no mind to them, but since returning she's noticed an increase in frequency and severity of dyspnea. She only notices symptoms with exertion - mostly while traversing the stairs. No chest pain, fevers/chills, or other symptoms reported and no distress noted during the call.     [facility] appt scheduled for tomorrow 7/29/25 at 12:30 pm with PCP. Care advice and full assessment as noted below. Patient agreeable to plan of care.      Disposition  See Today or Tomorrow in Office (overriding Go to Office Now)    Reason for Disposition     Patient wants to be seen      Initial Assessment  1. RESPIRATORY STATUS: \"Describe your breathing?\" (e.g., wheezing, shortness of breath, unable to speak, severe coughing)       Shortness of breath with exertion  2. ONSET: \"When did this breathing problem begin?\"       She went to [place] at the end of June and was walking in the mountains - started getting winded but ignored it. Since she's been home she's noticed it worsening with any exertion - especially traversing the stairs. Also noticing SOB with the heat.  Also noticed an intermittent cough since she's been back  3. PATTERN \"Does the difficult breathing come and go, or has it been constant since it started?\"       Intermittent  4. SEVERITY: \"How bad is your breathing?\" (e.g., mild, moderate, severe)       Moderate  5. RECURRENT SYMPTOM: \"Have you had difficulty breathing before?\" If Yes, ask: \"When was the last time?\" and \"What happened that time?\"       Never  6. CARDIAC HISTORY: \"Do you have any " "history of heart disease?\" (e.g., heart attack, angina, bypass surgery, angioplasty)       None  7. LUNG HISTORY: \"Do you have any history of lung disease?\"  (e.g., pulmonary embolus, asthma, emphysema)      None  8. CAUSE: \"What do you think is causing the breathing problem?\"       Unsure  9. OTHER SYMPTOMS: \"Do you have any other symptoms?\" (e.g., chest pain, cough, dizziness, fever, runny nose)      Just some intermittent cough  10. O2 SATURATION MONITOR:  \"Do you use an oxygen saturation monitor (pulse oximeter) at home?\" If Yes, ask: \"What is your reading (oxygen level) today?\" \"What is your usual oxygen saturation reading?\" (e.g., 95%)        Does not monitor  11. PREGNANCY: \"Is there any chance you are pregnant?\" \"When was your last menstrual period?\"        N/A  12. TRAVEL: \"Have you traveled out of the country in the last month?\" (e.g., travel history, exposures)        None      "

## 2025-07-28 NOTE — TELEPHONE ENCOUNTER
Regarding: red flag  ----- Message from Deisy CHECO sent at 7/28/2025 12:37 PM EDT -----  Patient called today with red flag complaint of shortness of breath, started about a month ago, states has difficulty breathing going up and down stairs.    INSURANCE/RTE Concern? no    Has the caller been verified as an authorized person to receive PHI? N\A  Method of Confirmation? N/a    Call transferred to Primary Care Nurse Triage Line.

## 2025-07-29 ENCOUNTER — OFFICE VISIT (OUTPATIENT)
Dept: PRIMARY CARE | Facility: CLINIC | Age: 50
End: 2025-07-29

## 2025-07-29 VITALS
WEIGHT: 221 LBS | BODY MASS INDEX: 34.69 KG/M2 | SYSTOLIC BLOOD PRESSURE: 110 MMHG | OXYGEN SATURATION: 98 % | TEMPERATURE: 97.3 F | DIASTOLIC BLOOD PRESSURE: 80 MMHG | HEIGHT: 67 IN | RESPIRATION RATE: 17 BRPM | HEART RATE: 66 BPM

## 2025-07-29 DIAGNOSIS — R06.09 DOE (DYSPNEA ON EXERTION): Primary | ICD-10-CM

## 2025-07-29 DIAGNOSIS — R05.9 COUGH, UNSPECIFIED TYPE: ICD-10-CM

## 2025-07-29 DIAGNOSIS — R06.2 WHEEZING: ICD-10-CM

## 2025-07-29 PROBLEM — E66.811 OBESITY (BMI 30.0-34.9): Status: ACTIVE | Noted: 2025-07-29

## 2025-07-29 PROBLEM — E66.09 CLASS 2 OBESITY DUE TO EXCESS CALORIES WITHOUT SERIOUS COMORBIDITY WITH BODY MASS INDEX (BMI) OF 35.0 TO 35.9 IN ADULT: Status: RESOLVED | Noted: 2024-10-24 | Resolved: 2025-07-29

## 2025-07-29 PROBLEM — E66.812 CLASS 2 OBESITY DUE TO EXCESS CALORIES WITHOUT SERIOUS COMORBIDITY WITH BODY MASS INDEX (BMI) OF 35.0 TO 35.9 IN ADULT: Status: RESOLVED | Noted: 2024-10-24 | Resolved: 2025-07-29

## 2025-07-29 PROCEDURE — 93000 ELECTROCARDIOGRAM COMPLETE: CPT | Performed by: FAMILY MEDICINE

## 2025-07-29 PROCEDURE — 99214 OFFICE O/P EST MOD 30 MIN: CPT | Performed by: FAMILY MEDICINE

## 2025-07-29 PROCEDURE — 3008F BODY MASS INDEX DOCD: CPT | Performed by: FAMILY MEDICINE

## 2025-07-29 ASSESSMENT — ENCOUNTER SYMPTOMS
DIZZINESS: 0
DYSURIA: 0
BLOOD IN STOOL: 0
WHEEZING: 1
SORE THROAT: 0
HEMATURIA: 0
PALPITATIONS: 0
CONSTIPATION: 0
FATIGUE: 0
FEVER: 0
SLEEP DISTURBANCE: 0
HEADACHES: 0
COUGH: 1
FREQUENCY: 0
NERVOUS/ANXIOUS: 0
CHILLS: 0
MYALGIAS: 0
ABDOMINAL PAIN: 0
SHORTNESS OF BREATH: 1
ARTHRALGIAS: 0
RHINORRHEA: 0

## 2025-07-29 ASSESSMENT — PATIENT HEALTH QUESTIONNAIRE - PHQ9: SUM OF ALL RESPONSES TO PHQ9 QUESTIONS 1 & 2: 0

## 2025-07-29 NOTE — PROGRESS NOTES
Subjective      Patient ID: Barbra Norwood is a 50 y.o. female.    She is here for shortness of breathe. She went to [place] outside of [place] toward end of June and was on the mountain/hot/woods and was walking around the neighborhood and walking up inclines was getting shortness of breathe but everyone was ok but got home and got sinus infection for 1wk and then now noticing she is having trouble breathing especially with exertion. Worse up and down stairs and outside in the heat and when she walks the treadmill. Last night coughing and wheezing. No fevers. No CP/palpitations. No fatigue.  No leg pain or swelling. Does notice heavy breathing and sounds labored.         The following have been reviewed and updated as appropriate in this visit:   Tobacco  Allergies  Meds  Problems  Med Hx  Surg Hx  Fam Hx       Review of Systems   Constitutional:  Negative for chills, fatigue and fever.   HENT:  Negative for ear discharge, ear pain, postnasal drip, rhinorrhea and sore throat.    Respiratory:  Positive for cough, shortness of breath and wheezing.    Cardiovascular:  Negative for chest pain and palpitations.   Gastrointestinal:  Negative for abdominal pain, blood in stool and constipation.   Genitourinary:  Negative for dysuria, frequency and hematuria.   Musculoskeletal:  Negative for arthralgias and myalgias.   Skin:  Negative for rash.   Neurological:  Negative for dizziness and headaches.   Psychiatric/Behavioral:  Negative for sleep disturbance and suicidal ideas. The patient is not nervous/anxious.        Current Outpatient Medications   Medication Sig Dispense Refill    ascorbic acid (VITAMIN C) 500 mg tablet Take 500 mg by mouth daily.      biotin 300 mcg tablet Take by mouth daily.      cholecalciferol, vitamin D3, 1,000 unit (25 mcg) tablet Take 1,000 Units by mouth daily.      citalopram (celeXA) 40 mg tablet Take 1 tablet (40 mg total) by mouth daily. 90 tablet 2    FISH OIL-omega-3  fatty acids (FISH OIL) 340-1,000 mg capsule Take 2 capsules by mouth daily.      meloxicam (MOBIC) 7.5 mg tablet QR AS NEEDED FOR PAIN IF NEEDED CAN INCREASE TO TAKE 2 TABLETS EVERY DAY      norethindrone-e.estradioL-iron (LO LOESTRIN FE) 1 mg-10 mcg (24)/10 mcg (2) per tablet TAKE 1 TABLET BY MOUTH EVERY DAY 84 tablet 2    semaglutide (OZEMPIC SUBQ)        No current facility-administered medications for this visit.     Past Medical History:   Diagnosis Date    Arthritis     L ankle s/p injection    Depression      Family History   Problem Relation Name Age of Onset    Breast cancer Biological Mother      Arthritis Biological Mother      Subarachnoid hemorrhage Biological Mother      Dementia Biological Mother      Pulmonary embolism Biological Father      Prostate cancer Biological Father      Skin cancer Biological Father      Skin cancer Biological Brother      Arthritis Maternal Grandmother      Arthritis Maternal Grandfather      Autism Biological Child      Breast cancer Mother's Sister       Past Surgical History   Procedure Laterality Date     section      2x    Cyst removal  2019    Tonsillectomy       Social History     Socioeconomic History    Marital status:      Spouse name: Not on file    Number of children: 2    Years of education: Not on file    Highest education level: Not on file   Occupational History    Occupation: Health    Tobacco Use    Smoking status: Never    Smokeless tobacco: Never   Vaping Use    Vaping status: Never Used   Substance and Sexual Activity    Alcohol use: Yes     Comment: rarely    Drug use: No    Sexual activity: Yes     Partners: Male   Other Topics Concern    Not on file   Social History Narrative    Not on file     Social Drivers of Health     Financial Resource Strain: Not on file   Food Insecurity: Not on file   Transportation Needs: Not on file   Physical Activity: Not on file   Stress: Not on file   Social Connections: Not on file  "  Intimate Partner Violence: Not on file   Housing Stability: Not on file     Allergies   Allergen Reactions    Bactrim [Sulfamethoxazole-Trimethoprim] Hives    Penicillins Angioedema       Objective   Visit Vitals  /80 (BP Location: Left upper arm, Patient Position: Sitting)   Pulse 66   Temp 36.3 °C (97.3 °F) (Temporal)   Resp 17   Ht 1.702 m (5' 7\")   Wt 100 kg (221 lb)   SpO2 98%   BMI 34.61 kg/m²     Physical Exam  Vitals and nursing note reviewed.   Constitutional:       Appearance: Normal appearance.   HENT:      Right Ear: Tympanic membrane normal. There is no impacted cerumen.      Left Ear: Tympanic membrane normal. There is no impacted cerumen.      Nose: Nose normal. No rhinorrhea.      Mouth/Throat:      Mouth: Mucous membranes are moist.      Pharynx: Oropharynx is clear. No posterior oropharyngeal erythema.   Cardiovascular:      Rate and Rhythm: Normal rate and regular rhythm.      Heart sounds: No murmur heard.  Pulmonary:      Effort: Pulmonary effort is normal.      Breath sounds: Normal breath sounds. No wheezing.      Comments: Walking down the griffith do hear some upper airway noises  Abdominal:      General: Bowel sounds are normal.      Palpations: Abdomen is soft.      Tenderness: There is no abdominal tenderness.   Musculoskeletal:         General: Normal range of motion.      Cervical back: Normal range of motion and neck supple.   Skin:     General: Skin is warm.      Findings: No rash.   Neurological:      General: No focal deficit present.      Mental Status: She is alert and oriented to person, place, and time.      Cranial Nerves: No cranial nerve deficit.   Psychiatric:         Mood and Affect: Mood normal.         Behavior: Behavior normal.         Assessment/Plan   Problem List Items Addressed This Visit    None  Visit Diagnoses         MOORE (dyspnea on exertion)    -  Primary    Relevant Orders    ECG 12 LEAD OFFICE PERFORMED (Completed)    X-RAY CHEST 2 VIEWS    D-DIMER    CBC "    Comprehensive metabolic panel      Cough, unspecified type        Relevant Orders    X-RAY CHEST 2 VIEWS    D-DIMER    CBC    Comprehensive metabolic panel      Wheezing        Relevant Orders    X-RAY CHEST 2 VIEWS    D-DIMER    CBC    Comprehensive metabolic panel        She has been dealing with MOORE for past month since her trip to [place]. She is without insurance at this time but trying to get on Obamacare until Oct. EKG done which was stable compared to last one. We will check CXR, labs (need to include ddimer) along with trial of albuterol inhaler and see how see feels with it. She is aware how to use it and side effects. She knows to keep me updated in few days. We also discussed pending how she feels she may need more testing like CT scan of chest, echo, and possible lung function tests. She knows if anything worsens to ER.     Mignon Encarnacion,   7/29/2025

## 2025-08-07 ENCOUNTER — HOSPITAL ENCOUNTER (OUTPATIENT)
Dept: RADIOLOGY | Facility: CLINIC | Age: 50
Discharge: HOME | End: 2025-08-07
Attending: FAMILY MEDICINE
Payer: COMMERCIAL

## 2025-08-07 DIAGNOSIS — R06.2 WHEEZING: ICD-10-CM

## 2025-08-07 DIAGNOSIS — R06.09 DOE (DYSPNEA ON EXERTION): ICD-10-CM

## 2025-08-07 DIAGNOSIS — R05.9 COUGH, UNSPECIFIED TYPE: ICD-10-CM

## 2025-08-07 PROCEDURE — 71046 X-RAY EXAM CHEST 2 VIEWS: CPT

## 2025-08-10 LAB
ALBUMIN SERPL-MCNC: 4.3 G/DL (ref 3.6–5.1)
ALBUMIN/GLOB SERPL: 1.6 (CALC) (ref 1–2.5)
ALP SERPL-CCNC: 74 U/L (ref 37–153)
ALT SERPL-CCNC: 11 U/L (ref 6–29)
AST SERPL-CCNC: 12 U/L (ref 10–35)
BILIRUB SERPL-MCNC: 0.6 MG/DL (ref 0.2–1.2)
BUN SERPL-MCNC: 12 MG/DL (ref 7–25)
BUN/CREAT SERPL: NORMAL (CALC) (ref 6–22)
CALCIUM SERPL-MCNC: 9.4 MG/DL (ref 8.6–10.4)
CHLORIDE SERPL-SCNC: 104 MMOL/L (ref 98–110)
CO2 SERPL-SCNC: 28 MMOL/L (ref 20–32)
CREAT SERPL-MCNC: 0.82 MG/DL (ref 0.5–1.03)
D DIMER PPP FEU-MCNC: 0.85 MCG/ML FEU
EGFRCR SERPLBLD CKD-EPI 2021: 87 ML/MIN/1.73M2
ERYTHROCYTE [DISTWIDTH] IN BLOOD BY AUTOMATED COUNT: 12.8 % (ref 11–15)
GLOBULIN SER CALC-MCNC: 2.7 G/DL (CALC) (ref 1.9–3.7)
GLUCOSE SERPL-MCNC: 82 MG/DL (ref 65–99)
HCT VFR BLD AUTO: 44.9 % (ref 35–45)
HGB BLD-MCNC: 14.5 G/DL (ref 11.7–15.5)
MCH RBC QN AUTO: 30.7 PG (ref 27–33)
MCHC RBC AUTO-ENTMCNC: 32.3 G/DL (ref 32–36)
MCV RBC AUTO: 94.9 FL (ref 80–100)
PLATELET # BLD AUTO: 226 THOUSAND/UL (ref 140–400)
PMV BLD REES-ECKER: 11.3 FL (ref 7.5–12.5)
POTASSIUM SERPL-SCNC: 4.2 MMOL/L (ref 3.5–5.3)
PROT SERPL-MCNC: 7 G/DL (ref 6.1–8.1)
RBC # BLD AUTO: 4.73 MILLION/UL (ref 3.8–5.1)
SODIUM SERPL-SCNC: 140 MMOL/L (ref 135–146)
WBC # BLD AUTO: 8 THOUSAND/UL (ref 3.8–10.8)

## 2025-08-11 ENCOUNTER — HOSPITAL ENCOUNTER (OUTPATIENT)
Dept: RADIOLOGY | Facility: HOSPITAL | Age: 50
Discharge: HOME | End: 2025-08-11
Attending: FAMILY MEDICINE
Payer: COMMERCIAL

## 2025-08-11 DIAGNOSIS — R06.09 DOE (DYSPNEA ON EXERTION): ICD-10-CM

## 2025-08-11 DIAGNOSIS — R79.89 ELEVATED D-DIMER: ICD-10-CM

## 2025-08-11 PROCEDURE — 71275 CT ANGIOGRAPHY CHEST: CPT

## 2025-08-11 PROCEDURE — 63600105 HC IODINE BASED CONTRAST: Mod: JZ

## 2025-08-11 RX ORDER — IOPAMIDOL 755 MG/ML
100 INJECTION, SOLUTION INTRAVASCULAR
Status: COMPLETED | OUTPATIENT
Start: 2025-08-11 | End: 2025-08-11

## 2025-08-11 RX ADMIN — IOPAMIDOL 100 ML: 755 INJECTION, SOLUTION INTRAVENOUS at 12:12
